# Patient Record
Sex: MALE | Race: AMERICAN INDIAN OR ALASKA NATIVE | Employment: STUDENT | URBAN - METROPOLITAN AREA
[De-identification: names, ages, dates, MRNs, and addresses within clinical notes are randomized per-mention and may not be internally consistent; named-entity substitution may affect disease eponyms.]

---

## 2024-08-21 ENCOUNTER — HOSPITAL ENCOUNTER (INPATIENT)
Facility: HOSPITAL | Age: 24
LOS: 2 days | Discharge: HOME | DRG: 872 | End: 2024-08-24
Attending: EMERGENCY MEDICINE | Admitting: INTERNAL MEDICINE
Payer: COMMERCIAL

## 2024-08-21 ENCOUNTER — APPOINTMENT (OUTPATIENT)
Facility: HOSPITAL | Age: 24
DRG: 872 | End: 2024-08-21
Payer: COMMERCIAL

## 2024-08-21 DIAGNOSIS — R50.9 ACUTE FEBRILE ILLNESS: Primary | ICD-10-CM

## 2024-08-21 DIAGNOSIS — R79.89 ELEVATED TROPONIN: ICD-10-CM

## 2024-08-21 DIAGNOSIS — B17.9 ACUTE HEPATITIS: ICD-10-CM

## 2024-08-21 DIAGNOSIS — R65.20 SEVERE SEPSIS (HCC): ICD-10-CM

## 2024-08-21 DIAGNOSIS — A41.9 SEVERE SEPSIS (HCC): ICD-10-CM

## 2024-08-21 DIAGNOSIS — E87.1 HYPONATREMIA: ICD-10-CM

## 2024-08-21 LAB
ALBUMIN SERPL-MCNC: 3.5 G/DL (ref 3.5–5)
ALBUMIN/GLOB SERPL: 1.1 (ref 1.1–2.2)
ALP SERPL-CCNC: 49 U/L (ref 45–117)
ALT SERPL-CCNC: 681 U/L (ref 12–78)
ANION GAP SERPL CALC-SCNC: 6 MMOL/L (ref 5–15)
AST SERPL-CCNC: 678 U/L (ref 15–37)
BILIRUB SERPL-MCNC: 0.5 MG/DL (ref 0.2–1)
BUN SERPL-MCNC: 14 MG/DL (ref 6–20)
BUN/CREAT SERPL: 10 (ref 12–20)
CALCIUM SERPL-MCNC: 8.6 MG/DL (ref 8.5–10.1)
CHLORIDE SERPL-SCNC: 93 MMOL/L (ref 97–108)
CO2 SERPL-SCNC: 28 MMOL/L (ref 21–32)
CREAT SERPL-MCNC: 1.44 MG/DL (ref 0.7–1.3)
CRP SERPL-MCNC: 4.52 MG/DL (ref 0–0.3)
GLOBULIN SER CALC-MCNC: 3.3 G/DL (ref 2–4)
GLUCOSE BLD STRIP.AUTO-MCNC: 106 MG/DL (ref 65–117)
GLUCOSE SERPL-MCNC: 116 MG/DL (ref 65–100)
LACTATE BLD-SCNC: 1.46 MMOL/L (ref 0.4–2)
POTASSIUM SERPL-SCNC: 3.9 MMOL/L (ref 3.5–5.1)
PROT SERPL-MCNC: 6.8 G/DL (ref 6.4–8.2)
SERVICE CMNT-IMP: NORMAL
SODIUM SERPL-SCNC: 127 MMOL/L (ref 136–145)
TROPONIN I SERPL HS-MCNC: 108 NG/L (ref 0–76)

## 2024-08-21 PROCEDURE — 85025 COMPLETE CBC W/AUTO DIFF WBC: CPT

## 2024-08-21 PROCEDURE — 86140 C-REACTIVE PROTEIN: CPT

## 2024-08-21 PROCEDURE — 96361 HYDRATE IV INFUSION ADD-ON: CPT

## 2024-08-21 PROCEDURE — 99285 EMERGENCY DEPT VISIT HI MDM: CPT

## 2024-08-21 PROCEDURE — 71045 X-RAY EXAM CHEST 1 VIEW: CPT

## 2024-08-21 PROCEDURE — 96374 THER/PROPH/DIAG INJ IV PUSH: CPT

## 2024-08-21 PROCEDURE — 96375 TX/PRO/DX INJ NEW DRUG ADDON: CPT

## 2024-08-21 PROCEDURE — 6370000000 HC RX 637 (ALT 250 FOR IP): Performed by: STUDENT IN AN ORGANIZED HEALTH CARE EDUCATION/TRAINING PROGRAM

## 2024-08-21 PROCEDURE — 80053 COMPREHEN METABOLIC PANEL: CPT

## 2024-08-21 PROCEDURE — 82962 GLUCOSE BLOOD TEST: CPT

## 2024-08-21 PROCEDURE — 87040 BLOOD CULTURE FOR BACTERIA: CPT

## 2024-08-21 PROCEDURE — 6360000002 HC RX W HCPCS: Performed by: STUDENT IN AN ORGANIZED HEALTH CARE EDUCATION/TRAINING PROGRAM

## 2024-08-21 PROCEDURE — 84145 PROCALCITONIN (PCT): CPT

## 2024-08-21 PROCEDURE — 87636 SARSCOV2 & INF A&B AMP PRB: CPT

## 2024-08-21 PROCEDURE — 84484 ASSAY OF TROPONIN QUANT: CPT

## 2024-08-21 PROCEDURE — 2580000003 HC RX 258: Performed by: STUDENT IN AN ORGANIZED HEALTH CARE EDUCATION/TRAINING PROGRAM

## 2024-08-21 PROCEDURE — 86738 MYCOPLASMA ANTIBODY: CPT

## 2024-08-21 PROCEDURE — 80074 ACUTE HEPATITIS PANEL: CPT

## 2024-08-21 PROCEDURE — 83605 ASSAY OF LACTIC ACID: CPT

## 2024-08-21 PROCEDURE — 36415 COLL VENOUS BLD VENIPUNCTURE: CPT

## 2024-08-21 RX ORDER — LIDOCAINE 4 G/G
1 PATCH TOPICAL
Status: DISPENSED | OUTPATIENT
Start: 2024-08-21 | End: 2024-08-22

## 2024-08-21 RX ORDER — ACETAMINOPHEN 500 MG
1000 TABLET ORAL
Status: COMPLETED | OUTPATIENT
Start: 2024-08-21 | End: 2024-08-21

## 2024-08-21 RX ORDER — KETOROLAC TROMETHAMINE 30 MG/ML
30 INJECTION, SOLUTION INTRAMUSCULAR; INTRAVENOUS ONCE
Status: COMPLETED | OUTPATIENT
Start: 2024-08-21 | End: 2024-08-21

## 2024-08-21 RX ORDER — 0.9 % SODIUM CHLORIDE 0.9 %
30 INTRAVENOUS SOLUTION INTRAVENOUS ONCE
Status: COMPLETED | OUTPATIENT
Start: 2024-08-21 | End: 2024-08-22

## 2024-08-21 RX ADMIN — KETOROLAC TROMETHAMINE 30 MG: 30 INJECTION, SOLUTION INTRAMUSCULAR at 23:57

## 2024-08-21 RX ADMIN — ACETAMINOPHEN 1000 MG: 500 TABLET ORAL at 23:26

## 2024-08-21 RX ADMIN — SODIUM CHLORIDE 2244 ML: 9 INJECTION, SOLUTION INTRAVENOUS at 23:27

## 2024-08-21 ASSESSMENT — PAIN DESCRIPTION - DESCRIPTORS: DESCRIPTORS: ACHING

## 2024-08-21 ASSESSMENT — PAIN - FUNCTIONAL ASSESSMENT: PAIN_FUNCTIONAL_ASSESSMENT: ACTIVITIES ARE NOT PREVENTED

## 2024-08-21 ASSESSMENT — PAIN SCALES - GENERAL: PAINLEVEL_OUTOF10: 4

## 2024-08-22 ENCOUNTER — APPOINTMENT (OUTPATIENT)
Facility: HOSPITAL | Age: 24
DRG: 872 | End: 2024-08-22
Attending: INTERNAL MEDICINE
Payer: COMMERCIAL

## 2024-08-22 ENCOUNTER — APPOINTMENT (OUTPATIENT)
Facility: HOSPITAL | Age: 24
DRG: 872 | End: 2024-08-22
Payer: COMMERCIAL

## 2024-08-22 PROBLEM — N17.9 AKI (ACUTE KIDNEY INJURY) (HCC): Status: ACTIVE | Noted: 2024-08-22

## 2024-08-22 PROBLEM — E87.1 HYPONATREMIA: Status: ACTIVE | Noted: 2024-08-22

## 2024-08-22 PROBLEM — R21 RASH: Status: ACTIVE | Noted: 2024-08-22

## 2024-08-22 PROBLEM — R74.8 ELEVATED LIVER ENZYMES: Status: ACTIVE | Noted: 2024-08-22

## 2024-08-22 PROBLEM — D72.819 LEUKOPENIA: Status: ACTIVE | Noted: 2024-08-22

## 2024-08-22 PROBLEM — A41.9 SEPSIS (HCC): Status: ACTIVE | Noted: 2024-08-22

## 2024-08-22 PROBLEM — B17.9 ACUTE HEPATITIS: Status: ACTIVE | Noted: 2024-08-22

## 2024-08-22 PROBLEM — R50.9 ACUTE FEBRILE ILLNESS: Status: ACTIVE | Noted: 2024-08-22

## 2024-08-22 LAB
-: NORMAL
ALBUMIN SERPL-MCNC: 3.1 G/DL (ref 3.5–5)
ALBUMIN/GLOB SERPL: 1 (ref 1.1–2.2)
ALP SERPL-CCNC: 49 U/L (ref 45–117)
ALT SERPL-CCNC: 726 U/L (ref 12–78)
ANION GAP SERPL CALC-SCNC: 6 MMOL/L (ref 5–15)
APPEARANCE UR: CLEAR
AST SERPL-CCNC: 681 U/L (ref 15–37)
B PERT DNA SPEC QL NAA+PROBE: NOT DETECTED
BACTERIA URNS QL MICRO: NEGATIVE /HPF
BASOPHILS # BLD: 0 K/UL (ref 0–0.1)
BASOPHILS NFR BLD: 0 % (ref 0–1)
BILIRUB SERPL-MCNC: 0.4 MG/DL (ref 0.2–1)
BILIRUB UR QL: NEGATIVE
BORDETELLA PARAPERTUSSIS BY PCR: NOT DETECTED
BUN SERPL-MCNC: 8 MG/DL (ref 6–20)
BUN/CREAT SERPL: 7 (ref 12–20)
C PNEUM DNA SPEC QL NAA+PROBE: NOT DETECTED
CALCIUM SERPL-MCNC: 7.7 MG/DL (ref 8.5–10.1)
CHLORIDE SERPL-SCNC: 103 MMOL/L (ref 97–108)
CO2 SERPL-SCNC: 23 MMOL/L (ref 21–32)
COLOR UR: ABNORMAL
COMMENT:: NORMAL
CREAT SERPL-MCNC: 1.08 MG/DL (ref 0.7–1.3)
CREAT UR-MCNC: 105 MG/DL
CRP SERPL-MCNC: 3.7 MG/DL (ref 0–0.3)
DIFFERENTIAL METHOD BLD: ABNORMAL
ECHO AO ARCH DIAM: 1.9 CM
ECHO AO ASC DIAM: 2.3 CM
ECHO AO ASCENDING AORTA INDEX: 1.21 CM/M2
ECHO AO ROOT DIAM: 3.5 CM
ECHO AO ROOT INDEX: 1.84 CM/M2
ECHO AV AREA PEAK VELOCITY: 3.6 CM2
ECHO AV AREA VTI: 3.7 CM2
ECHO AV AREA/BSA PEAK VELOCITY: 1.9 CM2/M2
ECHO AV AREA/BSA VTI: 1.9 CM2/M2
ECHO AV MEAN GRADIENT: 3 MMHG
ECHO AV MEAN VELOCITY: 0.8 M/S
ECHO AV PEAK GRADIENT: 5 MMHG
ECHO AV PEAK VELOCITY: 1.2 M/S
ECHO AV VELOCITY RATIO: 0.83
ECHO AV VTI: 21.7 CM
ECHO BSA: 1.89 M2
ECHO EST RA PRESSURE: 3 MMHG
ECHO LA DIAMETER INDEX: 1.63 CM/M2
ECHO LA DIAMETER: 3.1 CM
ECHO LA TO AORTIC ROOT RATIO: 0.89
ECHO LA VOL A-L A2C: 36 ML (ref 18–58)
ECHO LA VOL A-L A4C: 49 ML (ref 18–58)
ECHO LA VOL BP: 41 ML (ref 18–58)
ECHO LA VOL MOD A2C: 34 ML (ref 18–58)
ECHO LA VOL MOD A4C: 41 ML (ref 18–58)
ECHO LA VOL/BSA BIPLANE: 22 ML/M2 (ref 16–34)
ECHO LA VOLUME AREA LENGTH: 47 ML
ECHO LA VOLUME INDEX A-L A2C: 19 ML/M2 (ref 16–34)
ECHO LA VOLUME INDEX A-L A4C: 26 ML/M2 (ref 16–34)
ECHO LA VOLUME INDEX AREA LENGTH: 25 ML/M2 (ref 16–34)
ECHO LA VOLUME INDEX MOD A2C: 18 ML/M2 (ref 16–34)
ECHO LA VOLUME INDEX MOD A4C: 22 ML/M2 (ref 16–34)
ECHO LV E' LATERAL VELOCITY: 19 CM/S
ECHO LV E' SEPTAL VELOCITY: 13 CM/S
ECHO LV EDV A2C: 96 ML
ECHO LV EDV A4C: 142 ML
ECHO LV EDV BP: 119 ML (ref 67–155)
ECHO LV EDV INDEX A4C: 75 ML/M2
ECHO LV EDV INDEX BP: 63 ML/M2
ECHO LV EDV NDEX A2C: 51 ML/M2
ECHO LV EJECTION FRACTION A2C: 59 %
ECHO LV EJECTION FRACTION A4C: 66 %
ECHO LV EJECTION FRACTION BIPLANE: 63 % (ref 55–100)
ECHO LV ESV A2C: 40 ML
ECHO LV ESV A4C: 48 ML
ECHO LV ESV BP: 44 ML (ref 22–58)
ECHO LV ESV INDEX A2C: 21 ML/M2
ECHO LV ESV INDEX A4C: 25 ML/M2
ECHO LV ESV INDEX BP: 23 ML/M2
ECHO LV FRACTIONAL SHORTENING: 32 % (ref 28–44)
ECHO LV INTERNAL DIMENSION DIASTOLE INDEX: 2.47 CM/M2
ECHO LV INTERNAL DIMENSION DIASTOLIC: 4.7 CM (ref 4.2–5.9)
ECHO LV INTERNAL DIMENSION SYSTOLIC INDEX: 1.68 CM/M2
ECHO LV INTERNAL DIMENSION SYSTOLIC: 3.2 CM
ECHO LV IVSD: 0.9 CM (ref 0.6–1)
ECHO LV MASS 2D: 175.8 G (ref 88–224)
ECHO LV MASS INDEX 2D: 92.5 G/M2 (ref 49–115)
ECHO LV POSTERIOR WALL DIASTOLIC: 1.2 CM (ref 0.6–1)
ECHO LV RELATIVE WALL THICKNESS RATIO: 0.51
ECHO LVOT AREA: 4.5 CM2
ECHO LVOT AV VTI INDEX: 0.84
ECHO LVOT DIAM: 2.4 CM
ECHO LVOT MEAN GRADIENT: 2 MMHG
ECHO LVOT PEAK GRADIENT: 4 MMHG
ECHO LVOT PEAK VELOCITY: 1 M/S
ECHO LVOT STROKE VOLUME INDEX: 43.3 ML/M2
ECHO LVOT SV: 82.3 ML
ECHO LVOT VTI: 18.2 CM
ECHO MV A VELOCITY: 0.42 M/S
ECHO MV AREA VTI: 3.6 CM2
ECHO MV E DECELERATION TIME (DT): 204.5 MS
ECHO MV E VELOCITY: 0.79 M/S
ECHO MV E/A RATIO: 1.88
ECHO MV E/E' LATERAL: 4.16
ECHO MV E/E' RATIO (AVERAGED): 5.12
ECHO MV E/E' SEPTAL: 6.08
ECHO MV LVOT VTI INDEX: 1.27
ECHO MV MAX VELOCITY: 1 M/S
ECHO MV MEAN GRADIENT: 2 MMHG
ECHO MV MEAN VELOCITY: 0.6 M/S
ECHO MV PEAK GRADIENT: 4 MMHG
ECHO MV VTI: 23.1 CM
ECHO PULMONARY ARTERY END DIASTOLIC PRESSURE: 5 MMHG
ECHO PV REGURGITANT MAX VELOCITY: 1.1 M/S
ECHO RIGHT VENTRICULAR SYSTOLIC PRESSURE (RVSP): 24 MMHG
ECHO RV FREE WALL PEAK S': 22 CM/S
ECHO RV INTERNAL DIMENSION: 4.6 CM
ECHO RV TAPSE: 2.5 CM (ref 1.7–?)
ECHO TV REGURGITANT MAX VELOCITY: 2.29 M/S
ECHO TV REGURGITANT PEAK GRADIENT: 21 MMHG
EOSINOPHIL # BLD: 0.1 K/UL (ref 0–0.4)
EOSINOPHIL #/AREA URNS HPF: 0
EOSINOPHIL NFR BLD: 6 % (ref 0–7)
EPITH CASTS URNS QL MICRO: ABNORMAL /LPF
ERYTHROCYTE [DISTWIDTH] IN BLOOD BY AUTOMATED COUNT: 12.4 % (ref 11.5–14.5)
ERYTHROCYTE [DISTWIDTH] IN BLOOD BY AUTOMATED COUNT: 12.6 % (ref 11.5–14.5)
ERYTHROCYTE [SEDIMENTATION RATE] IN BLOOD: 7 MM/HR (ref 0–15)
FLUAV RNA SPEC QL NAA+PROBE: NOT DETECTED
FLUAV SUBTYP SPEC NAA+PROBE: NOT DETECTED
FLUBV RNA SPEC QL NAA+PROBE: NOT DETECTED
FLUBV RNA SPEC QL NAA+PROBE: NOT DETECTED
GLOBULIN SER CALC-MCNC: 3.1 G/DL (ref 2–4)
GLUCOSE SERPL-MCNC: 103 MG/DL (ref 65–100)
GLUCOSE UR STRIP.AUTO-MCNC: NEGATIVE MG/DL
HADV DNA SPEC QL NAA+PROBE: NOT DETECTED
HAV IGM SER QL: NONREACTIVE
HBV CORE IGM SER QL: NONREACTIVE
HBV SURFACE AG SER QL: <0.1 INDEX
HBV SURFACE AG SER QL: NEGATIVE
HCOV 229E RNA SPEC QL NAA+PROBE: NOT DETECTED
HCOV HKU1 RNA SPEC QL NAA+PROBE: NOT DETECTED
HCOV NL63 RNA SPEC QL NAA+PROBE: NOT DETECTED
HCOV OC43 RNA SPEC QL NAA+PROBE: NOT DETECTED
HCT VFR BLD AUTO: 40.6 % (ref 36.6–50.3)
HCT VFR BLD AUTO: 41.8 % (ref 36.6–50.3)
HCV AB SER IA-ACNC: 0.21 INDEX
HCV AB SERPL QL IA: NONREACTIVE
HGB BLD-MCNC: 14.3 G/DL (ref 12.1–17)
HGB BLD-MCNC: 14.3 G/DL (ref 12.1–17)
HGB UR QL STRIP: NEGATIVE
HMPV RNA SPEC QL NAA+PROBE: NOT DETECTED
HPIV1 RNA SPEC QL NAA+PROBE: NOT DETECTED
HPIV2 RNA SPEC QL NAA+PROBE: NOT DETECTED
HPIV3 RNA SPEC QL NAA+PROBE: NOT DETECTED
HPIV4 RNA SPEC QL NAA+PROBE: NOT DETECTED
HYALINE CASTS URNS QL MICRO: ABNORMAL /LPF (ref 0–5)
IMM GRANULOCYTES # BLD AUTO: 0 K/UL
IMM GRANULOCYTES NFR BLD AUTO: 0 %
KETONES UR QL STRIP.AUTO: 15 MG/DL
LEUKOCYTE ESTERASE UR QL STRIP.AUTO: NEGATIVE
LYMPHOCYTES # BLD: 0.5 K/UL (ref 0.8–3.5)
LYMPHOCYTES NFR BLD: 27 % (ref 12–49)
M PNEUMO DNA SPEC QL NAA+PROBE: NOT DETECTED
MCH RBC QN AUTO: 30.4 PG (ref 26–34)
MCH RBC QN AUTO: 30.6 PG (ref 26–34)
MCHC RBC AUTO-ENTMCNC: 34.2 G/DL (ref 30–36.5)
MCHC RBC AUTO-ENTMCNC: 35.2 G/DL (ref 30–36.5)
MCV RBC AUTO: 86.9 FL (ref 80–99)
MCV RBC AUTO: 88.9 FL (ref 80–99)
MONOCYTES # BLD: 0.1 K/UL (ref 0–1)
MONOCYTES NFR BLD: 4 % (ref 5–13)
NEUTS BAND NFR BLD MANUAL: 22 % (ref 0–6)
NEUTS SEG # BLD: 1 K/UL (ref 1.8–8)
NEUTS SEG NFR BLD: 41 % (ref 32–75)
NITRITE UR QL STRIP.AUTO: NEGATIVE
NRBC # BLD: 0 K/UL (ref 0–0.01)
NRBC # BLD: 0 K/UL (ref 0–0.01)
NRBC BLD-RTO: 0 PER 100 WBC
NRBC BLD-RTO: 0 PER 100 WBC
PERIPHERAL SMEAR, MD REVIEW: NORMAL
PH UR STRIP: 6 (ref 5–8)
PLATELET # BLD AUTO: 121 K/UL (ref 150–400)
PLATELET # BLD AUTO: 97 K/UL (ref 150–400)
PMV BLD AUTO: 10 FL (ref 8.9–12.9)
PMV BLD AUTO: 10 FL (ref 8.9–12.9)
POTASSIUM SERPL-SCNC: 4.2 MMOL/L (ref 3.5–5.1)
PROCALCITONIN SERPL-MCNC: 0.66 NG/ML
PROT SERPL-MCNC: 6.2 G/DL (ref 6.4–8.2)
PROT UR STRIP-MCNC: ABNORMAL MG/DL
RBC # BLD AUTO: 4.67 M/UL (ref 4.1–5.7)
RBC # BLD AUTO: 4.7 M/UL (ref 4.1–5.7)
RBC #/AREA URNS HPF: ABNORMAL /HPF (ref 0–5)
RBC MORPH BLD: ABNORMAL
RSV RNA SPEC QL NAA+PROBE: NOT DETECTED
RV+EV RNA SPEC QL NAA+PROBE: NOT DETECTED
SARS-COV-2 RNA RESP QL NAA+PROBE: NOT DETECTED
SARS-COV-2 RNA RESP QL NAA+PROBE: NOT DETECTED
SODIUM SERPL-SCNC: 132 MMOL/L (ref 136–145)
SODIUM UR-SCNC: 10 MMOL/L
SOURCE: NORMAL
SP GR UR REFRACTOMETRY: 1.02 (ref 1–1.03)
SPECIMEN HOLD: NORMAL
TROPONIN I SERPL HS-MCNC: 84 NG/L (ref 0–76)
URINE CULTURE IF INDICATED: ABNORMAL
UROBILINOGEN UR QL STRIP.AUTO: 0.2 EU/DL (ref 0.2–1)
WBC # BLD AUTO: 1.5 K/UL (ref 4.1–11.1)
WBC # BLD AUTO: 1.7 K/UL (ref 4.1–11.1)
WBC MORPH BLD: ABNORMAL
WBC URNS QL MICRO: ABNORMAL /HPF (ref 0–4)

## 2024-08-22 PROCEDURE — 86666 EHRLICHIA ANTIBODY: CPT

## 2024-08-22 PROCEDURE — 70450 CT HEAD/BRAIN W/O DYE: CPT

## 2024-08-22 PROCEDURE — 82570 ASSAY OF URINE CREATININE: CPT

## 2024-08-22 PROCEDURE — 87484 EHRLICHA CHAFFEENSIS AMP PRB: CPT

## 2024-08-22 PROCEDURE — 36415 COLL VENOUS BLD VENIPUNCTURE: CPT

## 2024-08-22 PROCEDURE — 80053 COMPREHEN METABOLIC PANEL: CPT

## 2024-08-22 PROCEDURE — 96376 TX/PRO/DX INJ SAME DRUG ADON: CPT

## 2024-08-22 PROCEDURE — 96366 THER/PROPH/DIAG IV INF ADDON: CPT

## 2024-08-22 PROCEDURE — 96375 TX/PRO/DX INJ NEW DRUG ADDON: CPT

## 2024-08-22 PROCEDURE — 85027 COMPLETE CBC AUTOMATED: CPT

## 2024-08-22 PROCEDURE — 86757 RICKETTSIA ANTIBODY: CPT

## 2024-08-22 PROCEDURE — 76700 US EXAM ABDOM COMPLETE: CPT

## 2024-08-22 PROCEDURE — 86753 PROTOZOA ANTIBODY NOS: CPT

## 2024-08-22 PROCEDURE — 96368 THER/DIAG CONCURRENT INF: CPT

## 2024-08-22 PROCEDURE — G0378 HOSPITAL OBSERVATION PER HR: HCPCS

## 2024-08-22 PROCEDURE — 6370000000 HC RX 637 (ALT 250 FOR IP): Performed by: INTERNAL MEDICINE

## 2024-08-22 PROCEDURE — 96361 HYDRATE IV INFUSION ADD-ON: CPT

## 2024-08-22 PROCEDURE — 87468 ANAPLSMA PHGCYTOPHLM AMP PRB: CPT

## 2024-08-22 PROCEDURE — 0202U NFCT DS 22 TRGT SARS-COV-2: CPT

## 2024-08-22 PROCEDURE — 2580000003 HC RX 258: Performed by: INTERNAL MEDICINE

## 2024-08-22 PROCEDURE — 96372 THER/PROPH/DIAG INJ SC/IM: CPT

## 2024-08-22 PROCEDURE — 6360000002 HC RX W HCPCS: Performed by: STUDENT IN AN ORGANIZED HEALTH CARE EDUCATION/TRAINING PROGRAM

## 2024-08-22 PROCEDURE — 81001 URINALYSIS AUTO W/SCOPE: CPT

## 2024-08-22 PROCEDURE — 96367 TX/PROPH/DG ADDL SEQ IV INF: CPT

## 2024-08-22 PROCEDURE — 2500000003 HC RX 250 WO HCPCS: Performed by: INTERNAL MEDICINE

## 2024-08-22 PROCEDURE — 94761 N-INVAS EAR/PLS OXIMETRY MLT: CPT

## 2024-08-22 PROCEDURE — 93306 TTE W/DOPPLER COMPLETE: CPT

## 2024-08-22 PROCEDURE — 6360000002 HC RX W HCPCS: Performed by: INTERNAL MEDICINE

## 2024-08-22 PROCEDURE — 87205 SMEAR GRAM STAIN: CPT

## 2024-08-22 PROCEDURE — 93306 TTE W/DOPPLER COMPLETE: CPT | Performed by: SPECIALIST

## 2024-08-22 PROCEDURE — 96365 THER/PROPH/DIAG IV INF INIT: CPT

## 2024-08-22 PROCEDURE — 71275 CT ANGIOGRAPHY CHEST: CPT

## 2024-08-22 PROCEDURE — 87040 BLOOD CULTURE FOR BACTERIA: CPT

## 2024-08-22 PROCEDURE — 74177 CT ABD & PELVIS W/CONTRAST: CPT

## 2024-08-22 PROCEDURE — 84300 ASSAY OF URINE SODIUM: CPT

## 2024-08-22 PROCEDURE — 6360000002 HC RX W HCPCS: Performed by: EMERGENCY MEDICINE

## 2024-08-22 PROCEDURE — 2580000003 HC RX 258: Performed by: STUDENT IN AN ORGANIZED HEALTH CARE EDUCATION/TRAINING PROGRAM

## 2024-08-22 PROCEDURE — 86140 C-REACTIVE PROTEIN: CPT

## 2024-08-22 PROCEDURE — 86738 MYCOPLASMA ANTIBODY: CPT

## 2024-08-22 PROCEDURE — 86160 COMPLEMENT ANTIGEN: CPT

## 2024-08-22 PROCEDURE — 99223 1ST HOSP IP/OBS HIGH 75: CPT | Performed by: INTERNAL MEDICINE

## 2024-08-22 PROCEDURE — 6360000004 HC RX CONTRAST MEDICATION: Performed by: RADIOLOGY

## 2024-08-22 PROCEDURE — 86618 LYME DISEASE ANTIBODY: CPT

## 2024-08-22 PROCEDURE — 1100000000 HC RM PRIVATE

## 2024-08-22 PROCEDURE — 84484 ASSAY OF TROPONIN QUANT: CPT

## 2024-08-22 PROCEDURE — 85652 RBC SED RATE AUTOMATED: CPT

## 2024-08-22 PROCEDURE — 2580000003 HC RX 258: Performed by: EMERGENCY MEDICINE

## 2024-08-22 RX ORDER — DIPHENHYDRAMINE HYDROCHLORIDE 50 MG/ML
25 INJECTION INTRAMUSCULAR; INTRAVENOUS EVERY 6 HOURS PRN
Status: DISCONTINUED | OUTPATIENT
Start: 2024-08-22 | End: 2024-08-24 | Stop reason: HOSPADM

## 2024-08-22 RX ORDER — ENOXAPARIN SODIUM 100 MG/ML
40 INJECTION SUBCUTANEOUS DAILY
Status: DISCONTINUED | OUTPATIENT
Start: 2024-08-22 | End: 2024-08-24 | Stop reason: HOSPADM

## 2024-08-22 RX ORDER — IOPAMIDOL 755 MG/ML
100 INJECTION, SOLUTION INTRAVASCULAR
Status: COMPLETED | OUTPATIENT
Start: 2024-08-22 | End: 2024-08-22

## 2024-08-22 RX ORDER — SODIUM CHLORIDE 0.9 % (FLUSH) 0.9 %
5-40 SYRINGE (ML) INJECTION EVERY 12 HOURS SCHEDULED
Status: DISCONTINUED | OUTPATIENT
Start: 2024-08-22 | End: 2024-08-24 | Stop reason: HOSPADM

## 2024-08-22 RX ORDER — ACETAMINOPHEN 325 MG/1
650 TABLET ORAL EVERY 6 HOURS PRN
Status: DISCONTINUED | OUTPATIENT
Start: 2024-08-22 | End: 2024-08-24 | Stop reason: HOSPADM

## 2024-08-22 RX ORDER — ONDANSETRON 2 MG/ML
4 INJECTION INTRAMUSCULAR; INTRAVENOUS EVERY 6 HOURS PRN
Status: DISCONTINUED | OUTPATIENT
Start: 2024-08-22 | End: 2024-08-24 | Stop reason: HOSPADM

## 2024-08-22 RX ORDER — SODIUM CHLORIDE 0.9 % (FLUSH) 0.9 %
5-40 SYRINGE (ML) INJECTION PRN
Status: DISCONTINUED | OUTPATIENT
Start: 2024-08-22 | End: 2024-08-24 | Stop reason: HOSPADM

## 2024-08-22 RX ORDER — ACETAMINOPHEN 650 MG/1
650 SUPPOSITORY RECTAL EVERY 6 HOURS PRN
Status: DISCONTINUED | OUTPATIENT
Start: 2024-08-22 | End: 2024-08-24 | Stop reason: HOSPADM

## 2024-08-22 RX ORDER — 0.9 % SODIUM CHLORIDE 0.9 %
1000 INTRAVENOUS SOLUTION INTRAVENOUS ONCE
Status: COMPLETED | OUTPATIENT
Start: 2024-08-22 | End: 2024-08-22

## 2024-08-22 RX ORDER — SODIUM CHLORIDE 9 MG/ML
INJECTION, SOLUTION INTRAVENOUS PRN
Status: DISCONTINUED | OUTPATIENT
Start: 2024-08-22 | End: 2024-08-24 | Stop reason: HOSPADM

## 2024-08-22 RX ORDER — OXYCODONE HYDROCHLORIDE 5 MG/1
5 TABLET ORAL EVERY 4 HOURS PRN
Status: DISCONTINUED | OUTPATIENT
Start: 2024-08-22 | End: 2024-08-24 | Stop reason: HOSPADM

## 2024-08-22 RX ORDER — POLYETHYLENE GLYCOL 3350 17 G/17G
17 POWDER, FOR SOLUTION ORAL DAILY PRN
Status: DISCONTINUED | OUTPATIENT
Start: 2024-08-22 | End: 2024-08-24 | Stop reason: HOSPADM

## 2024-08-22 RX ORDER — SODIUM CHLORIDE 9 MG/ML
INJECTION, SOLUTION INTRAVENOUS CONTINUOUS
Status: DISCONTINUED | OUTPATIENT
Start: 2024-08-22 | End: 2024-08-24 | Stop reason: HOSPADM

## 2024-08-22 RX ADMIN — IOPAMIDOL 100 ML: 755 INJECTION, SOLUTION INTRAVENOUS at 00:53

## 2024-08-22 RX ADMIN — ACETAMINOPHEN 650 MG: 325 TABLET ORAL at 17:21

## 2024-08-22 RX ADMIN — SODIUM CHLORIDE, PRESERVATIVE FREE 10 ML: 5 INJECTION INTRAVENOUS at 20:21

## 2024-08-22 RX ADMIN — SODIUM CHLORIDE: 9 INJECTION, SOLUTION INTRAVENOUS at 02:06

## 2024-08-22 RX ADMIN — OXYCODONE HYDROCHLORIDE 5 MG: 5 TABLET ORAL at 10:01

## 2024-08-22 RX ADMIN — SODIUM CHLORIDE, PRESERVATIVE FREE 20 MG: 5 INJECTION INTRAVENOUS at 21:50

## 2024-08-22 RX ADMIN — SODIUM CHLORIDE, PRESERVATIVE FREE 10 ML: 5 INJECTION INTRAVENOUS at 09:56

## 2024-08-22 RX ADMIN — SODIUM CHLORIDE, PRESERVATIVE FREE 20 MG: 5 INJECTION INTRAVENOUS at 09:52

## 2024-08-22 RX ADMIN — WATER 2000 MG: 1 INJECTION INTRAMUSCULAR; INTRAVENOUS; SUBCUTANEOUS at 00:14

## 2024-08-22 RX ADMIN — SODIUM CHLORIDE 1000 ML: 9 INJECTION, SOLUTION INTRAVENOUS at 20:21

## 2024-08-22 RX ADMIN — CEFEPIME 2000 MG: 2 INJECTION, POWDER, FOR SOLUTION INTRAVENOUS at 09:53

## 2024-08-22 RX ADMIN — VANCOMYCIN HYDROCHLORIDE 1000 MG: 1 INJECTION, POWDER, LYOPHILIZED, FOR SOLUTION INTRAVENOUS at 11:23

## 2024-08-22 RX ADMIN — DOXYCYCLINE 100 MG: 100 INJECTION, POWDER, LYOPHILIZED, FOR SOLUTION INTRAVENOUS at 14:43

## 2024-08-22 RX ADMIN — DIPHENHYDRAMINE HYDROCHLORIDE 25 MG: 50 INJECTION INTRAMUSCULAR; INTRAVENOUS at 21:51

## 2024-08-22 RX ADMIN — ENOXAPARIN SODIUM 40 MG: 100 INJECTION SUBCUTANEOUS at 09:52

## 2024-08-22 RX ADMIN — DOXYCYCLINE 100 MG: 100 INJECTION, POWDER, LYOPHILIZED, FOR SOLUTION INTRAVENOUS at 04:12

## 2024-08-22 RX ADMIN — VANCOMYCIN HYDROCHLORIDE 1000 MG: 1 INJECTION, POWDER, LYOPHILIZED, FOR SOLUTION INTRAVENOUS at 21:54

## 2024-08-22 RX ADMIN — VANCOMYCIN HYDROCHLORIDE 1750 MG: 10 INJECTION, POWDER, LYOPHILIZED, FOR SOLUTION INTRAVENOUS at 00:28

## 2024-08-22 RX ADMIN — SODIUM CHLORIDE, PRESERVATIVE FREE 20 MG: 5 INJECTION INTRAVENOUS at 02:12

## 2024-08-22 RX ADMIN — WATER 2000 MG: 1 INJECTION INTRAMUSCULAR; INTRAVENOUS; SUBCUTANEOUS at 19:00

## 2024-08-22 ASSESSMENT — PAIN DESCRIPTION - ORIENTATION: ORIENTATION: MID;RIGHT;LEFT

## 2024-08-22 ASSESSMENT — PAIN SCALES - GENERAL
PAINLEVEL_OUTOF10: 0
PAINLEVEL_OUTOF10: 2
PAINLEVEL_OUTOF10: 6

## 2024-08-22 ASSESSMENT — PAIN DESCRIPTION - DESCRIPTORS: DESCRIPTORS: ACHING

## 2024-08-22 ASSESSMENT — PAIN DESCRIPTION - LOCATION: LOCATION: BACK;LEG

## 2024-08-22 NOTE — ED PROVIDER NOTES
[facility] EMERGENCY DEPT  EMERGENCY DEPARTMENT ENCOUNTER      Pt Name: Soto Hurley  MRN: [id number]  Birthdate 2000  Date of evaluation: 8/21/2024  Provider: PINKY Fofana    CHIEF COMPLAINT       Chief Complaint   Patient presents with    Generalized Body Aches    Nausea         HISTORY OF PRESENT ILLNESS    Patient is a 24-year-old male who presents to ED complaining of fever which started 1 week ago.  Patient reports he initially had a small abscess in his right groin last week.  His mother is a physician and performed an I&D and started him on Bactrim.  He started taking this on 8/14.  The following day he developed generalized myalgias, back pain, chills, fever and fatigue.  States he had a slight cough.  He traveled to [facility] with friends and thought he may have a virus. States he was drinking alcohol over the weekend with friends and is unsure if this could be playing a role.  He has been continuing to take Bactrim.  Reports he returned to [place] and for the past 3 days has had decreased appetite, nausea, diarrhea, fever, chills, fatigue and myalgias.  States he has been unable to get out of bed for the past 3 days.  He returned home today and his mother appreciated a bright red rash to his body as well as blistering on his lips.  She is concerned this could be a reaction from Bactrim such as serum sickness.  Denies any history of allergic reactions to any medications but states he has a history of seasonal allergies.  Patient also complains of slight back pain.  Denies any chest pain, shortness of breath, abdominal pain, open wounds or sloughing skin.  Denies similar symptoms previously.  He test himself for COVID a few days ago and it was negative at that time.            Review of External Medical Records:     Nursing Notes were reviewed.    REVIEW OF SYSTEMS       Review of Systems   All other systems reviewed and are negative.      Except as noted above the remainder of the review of  within normal limits   COVID-19 & INFLUENZA COMBO   CULTURE, BLOOD 1   CULTURE, BLOOD 2   PROCALCITONIN   POC LACTIC ACID   URINALYSIS WITH REFLEX TO CULTURE   MYCOPLASMA PNEUMONIAE ANTIBODIES IGG & IGM   HEPATITIS PANEL, ACUTE   POCT GLUCOSE   POCT LACTIC ACID   POCT LACTIC ACID   POCT GLUCOSE       All other labs were within normal range or not returned as of this dictation.    EMERGENCY DEPARTMENT COURSE and DIFFERENTIAL DIAGNOSIS/MDM:   Vitals:    Vitals:    08/21/24 2254 08/21/24 2315   BP: (!) 77/48 (!) 118/55   Pulse: 68 81   Resp: 18 27   Temp: 100 °F (37.8 °C)    TempSrc: Oral    SpO2: 96% 99%   Weight: 72.6 kg (160 lb)    Height: 1.778 m (5' 10\")            Medical Decision Making  Patient is a 24-year-old male with history as described above who presents with fever, chills, myalgias, nausea, decreased appetite as well as diffuse red rash.  On arrival he is hypotensive and febrile.  Differential diagnosis at this point is broad.  Patient's symptoms could be multifactorial in nature.  He does have a diffuse erythematous rash and blistering noted to his lips.  Concern for Jasso-Osman syndrome from Bactrim.  He also could have developed a viral illness in conjunction to this antibiotic reaction.  Sepsis order set initiated.  Will give patient IV fluids, Tylenol and reassess.    Workup significant for leukopenia with WBC count 1.7 with 22% bands.. platelets 121.  CRP is elevated.  Troponin is also elevated to 108.  His liver enzymes are also elevated.  Creatinine 1.44.  Will add on hepatitis panel as well as additional imaging given patient is septic with unknown cause.  Ordered cefepime and vancomycin..  Patient was seen by Dr. Nettles.  Spoke with both patient and mother regarding results.    Total critical care time (not including time spent performing separately reportable procedures): 50 minutes      Amount and/or Complexity of Data Reviewed  Labs: ordered. Decision-making details documented in ED

## 2024-08-22 NOTE — ED NOTES
Verbal report given to Heather ANGEL on Soto Hurley being transferred to [facility] for routine progression of patient care    Report consisted of patient's Situation, Background, Assessment and Recommendations (SBAR)    Information from the following report(s)  Nurse Handoff Report, ED SBAR, MAR, and Recent Results was reviewed with the receiving nurse.    Opportunity for questions and clarification was provided.    Patient transported with:  Registered nurse    Last Filed Values:  Vitals:    08/22/24 0215   BP: 111/62   Pulse: 84   Resp: 21   Temp:    SpO2: 99%          WBC   Date Value Ref Range Status   08/21/2024 1.7 (L) 4.1 - 11.1 K/uL Final        Blood Cultures Drawn:  Yes    Initial Lactic Acid (LA):  Time 2317,  Result 1.46    Repeat LA:  Time Due n/a, Done & Result n/a    Fluid Restriction:  Total needed 2244, Status completed, amount 2244    All Antibiotics Started:  Yes, Dose Due 0014    VS x 2 post-fluid resuscitation:   Yes    Vasopressor Infusion:  No Other n/a    Provider Reassessment needed and notified:  Yes, Due     Additional Interventions/Comments:  n/a

## 2024-08-22 NOTE — PROGRESS NOTES
Spiritual Health Assessment/Progress Note  [facility]    Rituals, Rites and Sacraments,  ,  ,      Name: Soto Hurley MRN: [id number]    Age: 24 y.o.     Sex: male   Language: English   Judaism: Judaism   Sepsis (HCC)     Date: 8/22/2024            Total Time Calculated: 5 min              Spiritual Assessment began in [facility] B3 INTERMEDIATE CARE UNIT        Referral/Consult From: Clergy/   Encounter Overview/Reason: Rituals, Rites and Sacraments  Service Provided For: Patient    Macy, Belief, Meaning:   Patient is connected with a macy tradition or spiritual practice  Family/Friends No family/friends present      Importance and Influence:  Patient has spiritual/personal beliefs that influence decisions regarding their health  Family/Friends no family/friends present    Community:  Patient Other: not known  Family/Friends Other: unknown    Assessment and Plan of Care:     Patient Interventions include: Provided sacramental/Congregation ritual  Family/Friends Interventions include: Other: none    Patient Plan of Care: Spiritual Care available upon further referral  Family/Friends Plan of Care: Spiritual Care available upon further referral    Electronically signed by Chaplain JERARDO on 8/22/2024 at 1:27 PM     Semantics3 visit attempted.  Mr. Hurley is Judaism. Due to medical restrictions, prayer for spiritual communion offered outside his room.     Sr. CONNIE Soto, RN, ACSW, LCSW   Page:  [phone])

## 2024-08-22 NOTE — CARE COORDINATION
Initial Case Management Assessment    Discharge Plan:  Anticipate DC to home with no CM needs identified.    Transportation at DC:  Family is able to assist    Pt was admitted on 8/21/2024 for fevers. CM met with Pt to complete initial assessment. CM introduced self, CM role, and verified demographics.     Pt has no hx of HH, home O2, or equipment. Pt is independent with ADLs and ambulation. Denies problems with either.     [email or web address]     08/22/24 8730   Service Assessment   Patient Orientation Alert and Oriented   History Provided By Patient;Child/Family  (Patient's father is at bedside)   Support Systems Parent;Family Members   Patient's Healthcare Decision Maker is: Legal Next of Kin  (Father at bedside)   PCP Verified by CM Yes  (Jason PARRA, Fabian [phone])   Last Visit to PCP Within last 3 months   Prior Functional Level Independent in ADLs/IADLs   Current Functional Level Independent in ADLs/IADLs   Can patient return to prior living arrangement Yes   Ability to make needs known: Good   Family able to assist with home care needs: Yes   Would you like for me to discuss the discharge plan with any other family members/significant others, and if so, who? No   Financial Resources Other (Comment)  (BCBS)   Community Resources None   CM/SW Referral Other (see comment)  (DC Planning)   Social/Functional History   Lives With Family   Bathroom Toilet Standard   Bathroom Equipment None   Bathroom Accessibility Accessible   Home Equipment None   Receives Help From Family   ADL Assistance Independent   Homemaking Assistance Independent   Ambulation Assistance Independent   Transfer Assistance Independent   Active  Yes   Mode of Transportation Car   Occupation Student: College   Discharge Planning   Type of Residence House   Living Arrangements Family Members   Current Services Prior To Admission None   Potential Assistance Needed N/A   DME Ordered? No   Potential Assistance Purchasing Medications No    Type of Home Care Services None   Patient expects to be discharged to: House   History of falls? 0   Services At/After Discharge   Transition of Care Consult (CM Consult) N/A   Services At/After Discharge None    Resource Information Provided? No   Mode of Transport at Discharge Other (see comment)   Confirm Follow Up Transport Family   Condition of Participation: Discharge Planning   The Plan for Transition of Care is related to the following treatment goals: Sepsis   Freedom of Choice list was provided with basic dialogue that supports the patient's individualized plan of care/goals, treatment preferences, and shares the quality data associated with the providers?  No     ______________________________________  SHIRIN Wade, RN-[facility]- Care Management  Available via [employer]  8/22/2024  12:28 PM

## 2024-08-22 NOTE — PROGRESS NOTES
[facility] Dosing Services: Antimicrobial Stewardship Daily Doc  Consult for antibiotic dosing of Vancomycin/Cefepime by Dr. Evans  Indication: Sepsis  Day of Therapy: 1    Ht Readings from Last 1 Encounters:   08/21/24 1.778 m (5' 10\")        Wt Readings from Last 1 Encounters:   08/21/24 72.6 kg (160 lb)      Vancomycin therapy:  Loading dose: Vancomycin 1750 mg x1 dose @ 0028 on 08/22/24  Maintenance dose: Vancomycin 1000 mg IV every 12 hours   Dose calculated to approximate a           a. Target AUC/YUDELKA of 400-600    Last level: na  Plan:   -Scr=1.44, no previous levels, may need to adjust maintenance dose in the morning based on  AM BMP  -Febrile, WBC=1.7  -will start 1000 mg every 12 hrs, predicted MDY=289, Trough=14.7, will order a random level once 1st maintenance dose is given.   Dose administration notes: Doses given appropriately as scheduled    Non-Kinetic Antimicrobial Dosing Regimen:   Current Regimen:  cefepime  2 g q8h EI  Recommendation: Continue same  Dose administration notes: Doses given appropriately as scheduled    Other Antimicrobial   (not dosed by pharmacist) Doxycycline 100 mg q12h   Cultures 08/21 Blood1  08/22 Blood2  08/22 Urine  08/21 Flu/Covid, negative, final   Serum Creatinine Lab Results   Component Value Date/Time    CREATININE 1.44 08/21/2024 11:02 PM          Creatinine Clearance Estimated Creatinine Clearance: 81 mL/min (A) (based on SCr of 1.44 mg/dL (H)).     Temp Temp: 100 °F (37.8 °C) (Oral)       WBC Lab Results   Component Value Date/Time    WBC 1.7 08/21/2024 11:02 PM          Procalcitonin Lab Results   Component Value Date/Time    PROCAL 0.66 08/21/2024 11:02 PM      For Antifungals, Metronidazole and Nafcillin: Lab Results   Component Value Date/Time     08/21/2024 11:02 PM     08/21/2024 11:02 PM        Pharmacist: Nitza BoatengD, MS, BCPS    [phone]

## 2024-08-22 NOTE — PROGRESS NOTES
Pharmacy Note - Extended Infusion Beta-Lactam Adjustment    Cefepime 1000mg Q8h for treatment of Sepsis of unknown etiology. Per [facility] Extended Infusion Beta-Lactam Policy, cefepime will be changed to 2000mg load followed by 2000mg Q8h extended infusion    Estimated Creatinine Clearance: Estimated Creatinine Clearance: 108 mL/min (based on SCr of 1.08 mg/dL).    BMI: Body mass index is 22.96 kg/m².    Please call with any questions.    Thank you,    NELLY CARVER, [facility]

## 2024-08-22 NOTE — CONSULTS
Infectious Disease Consult    Impression/Plan   Febrile illness with rash, leukopenia, thrombocytopenia and elevated transaminases.  With recent TMP-SMX use I suspect this is a drug reaction to the sulfa antibiotic.  Tickborne illness also in the differential as well as viral illness such as CMV or EBV.  TMP-SMX has been stopped.  Narrow antibiotics to doxycycline.  Serologies for tickborne illness pending.  Also at risk for Babesia as patient was in [facility] however symptoms began prior to travel so concern for babesiosis is low at this point.  Hopefully home tomorrow if symptoms and labs continue to improve  Recurrent right groin abscess.  Check MRSA screen    Anti-infectives:   Vancomycin  Cefepime  Doxycycline    Subjective:   Date of Consultation:  August 22, 2024  Date of Admission: 8/21/2024   Referring Physician:     Patient is a 24 y.o. male with past medical history significant for folliculitis who is being seen for febrile illness.  Patient has a history of recurrent right groin abscess.  His mother is a pediatrician and lanced the abscess approximately a week ago.  He was subsequently started on Bactrim.  The patient states that he began taking the Bactrim the day after the abscess was lanced and during travel to [facility].  The patient stayed in [place] for several days where he developed a fever, soaking sweats, decreased appetite, myalgias, and diarrhea.  He subsequently traveled to [facility] at which time the symptoms persisted.  Upon return to [place] he presented to [facility] where labs revealed leukopenia, thrombocytopenia, and elevated transaminases.  He has been started empirically on vancomycin, cefepime, and doxycycline.  Patient denies any insect bites    Patient Active Problem List   Diagnosis    Sepsis (HCC)    Rash    Elevated liver enzymes    NERY (acute kidney injury) (HCC)    Leukopenia     Past Medical History:   Diagnosis Date    Folliculitis       Family History  Peripheral Smear, MD Review        Pathologic examination results can be viewed in Epic Chart Review.   POC Lactic Acid    Collection Time: 08/21/24 11:17 PM   Result Value Ref Range    POC Lactic Acid 1.46 0.40 - 2.00 mmol/L   POCT Glucose    Collection Time: 08/21/24 11:19 PM   Result Value Ref Range    POC Glucose 106 65 - 117 mg/dL    Performed by: JOLIE PAINTER    Hepatitis Panel, Acute    Collection Time: 08/21/24 11:40 PM   Result Value Ref Range    Hep A IgM NONREACTIVE NR      -        Hepatitis B Surface Ag <0.10 Index    Hep B S Ag Interp Negative NEG      -        Hep B Core Ab, IgM NONREACTIVE NR      -        Hepatitis C Ab 0.21 Index    Hep C Ab Interp NONREACTIVE NR     Culture, Blood 2    Collection Time: 08/22/24 12:36 AM    Specimen: Blood   Result Value Ref Range    Special Requests RIGHT  Antecubital        Culture NO GROWTH AFTER 4 HOURS     Urinalysis with Reflex to Culture    Collection Time: 08/22/24  1:19 AM    Specimen: Urine   Result Value Ref Range    Color, UA YELLOW/STRAW      Appearance CLEAR CLEAR      Specific Gravity, UA 1.024 1.003 - 1.030      pH, Urine 6.0 5.0 - 8.0      Protein, UA TRACE (A) NEG mg/dL    Glucose, Ur Negative NEG mg/dL    Ketones, Urine 15 (A) NEG mg/dL    Bilirubin, Urine Negative NEG      Blood, Urine Negative NEG      Urobilinogen, Urine 0.2 0.2 - 1.0 EU/dL    Nitrite, Urine Negative NEG      Leukocyte Esterase, Urine Negative NEG      WBC, UA 0-4 0 - 4 /hpf    RBC, UA 0-5 0 - 5 /hpf    Epithelial Cells, UA MODERATE (A) FEW /lpf    BACTERIA, URINE Negative NEG /hpf    Urine Culture if Indicated CULTURE NOT INDICATED BY UA RESULT CNI      Hyaline Casts, UA 0-2 0 - 5 /lpf   Creatinine, Random Urine    Collection Time: 08/22/24  1:19 AM   Result Value Ref Range    Creatinine, Ur 105.00 mg/dL   Sodium, urine, random    Collection Time: 08/22/24  2:17 AM   Result Value Ref Range    SODIUM, RANDOM URINE 10 MMOL/L   Troponin    Collection Time: 08/22/24  5:30 AM

## 2024-08-22 NOTE — ED NOTES
TRANSFER - OUT REPORT:    Verbal report given to JEANNE Romero on Soto Hurley  being transferred to [facility] for routine progression of patient care       Report consisted of patient's Situation, Background, Assessment and   Recommendations(SBAR).     Information from the following report(s) Nurse Handoff Report, ED SBAR, MAR, and Recent Results was reviewed with the receiving nurse.    [place] Fall Assessment:    Presents to emergency department  because of falls (Syncope, seizure, or loss of consciousness): No  Age > 70: No  Altered Mental Status, Intoxication with alcohol or substance confusion (Disorientation, impaired judgment, poor safety awaremess, or inability to follow instructions): No  Impaired Mobility: Ambulates or transfers with assistive devices or assistance; Unable to ambulate or transer.: No  Nursing Judgement: No          Lines:   Peripheral IV 08/21/24 Left Antecubital (Active)       Peripheral IV 08/22/24 Right Antecubital (Active)        Opportunity for questions and clarification was provided.      Patient transported with:  Registered Nurse

## 2024-08-22 NOTE — PROGRESS NOTES
Hospitalist Progress Note      NAME:  Soto Hurley   :  2000  MRM:  [id number]    Date/Time: 2024  11:40 AM           Assessment / Plan:     23 yo otherwise healthy, presented w/ fevers, myalgia, sepsis, leukopenia, elevated LFTs      # Sepsis on admission  # Bactrim induced serum sickness vs tick prone illness vs Viral illness [ EBV CMV ]   Given leukopenia, hyponatremia, elevated LFTs on admission was initially concerning for tickborne illness versus Bactrim serum sickness  -Patient's history and lab findings although are concerning for serum sickness 2/2 Bactrim use, patient has reported Bactrim started after an abscess, with last dose reported yesterday  -CT head/chest/abdomen/pelvis were unremarkable  Plan  -Follow-up on labs collected in the ER and on admission  -Discussed with ID will continue patient on Doxy, will DC Vanco/cefepime  -DC Bactrim  -Follow-up on C3/C4  -Can start patient on steroids if needed    #Skin rash, improving  -Most likely from underlying autoimmune reaction    #Elevated LFTs  As above    #Leukopenia   follow-up patient's peripheral smear    # Elevated troponin  Trended down  Follow-up on TTE      #BMI (Calculated): 22.96    I have personally reviewed the radiographs, laboratory data in Epic and decisions and statements above are based partially on this personal interpretation.                 Care Plan discussed with: Patient    Discussed:  Care Plan    Prophylaxis:  Lovenox    Disposition:  Home w/Family           ___________________________________________________    Attending Physician: Mango Gallegos MD        Subjective:     Chief Complaint: Generalized fatigue/fever chills    ROS:  (bold if positive, if negative)    Tolerating PT  Tolerating Diet          Objective:   Patient has been stable since hospital admission with stable vital signs evaluated at bedside    Vitals:          Last 24hrs VS reviewed since prior progress note. Most recent are:    Vitals:     08/22/24 1117   BP: 107/64   Pulse: 82   Resp: 17   Temp: 99.3 °F (37.4 °C)   SpO2: 93%     SpO2 Readings from Last 6 Encounters:   08/22/24 93%          Intake/Output Summary (Last 24 hours) at 8/22/2024 1140  Last data filed at 8/22/2024 0734  Gross per 24 hour   Intake 500 ml   Output 900 ml   Net -400 ml          Exam:     Physical Exam:    Gen:  Well-developed, well-nourished, in no acute distress  HEENT:  Pink conjunctivae, PERRL, hearing intact to voice, moist mucous membranes  Neck:  Supple, without masses, thyroid non-tender  Resp:  No accessory muscle use, clear breath sounds without wheezes rales or rhonchi  Card:  No murmurs, normal S1, S2 without thrills, bruits or peripheral edema  Abd:  Soft, non-tender, non-distended, normoactive bowel sounds are present  Musc:  No cyanosis or clubbing  Skin: Diffuse erythema   neuro:  Cranial nerves 3-12 are grossly intact,  strength is 5/5 bilaterally and dorsi / plantarflexion is 5/5 bilaterally, follows commands appropriately  Psych:  Good insight, oriented to person, place and time, alert       Telemetry reviewed:   normal sinus rhythm    Medications Reviewed: (see below)    Lab Data Reviewed: (see below)    ______________________________________________________________________    Medications:     Current Facility-Administered Medications   Medication Dose Route Frequency    0.9 % sodium chloride infusion   IntraVENous Continuous    sodium chloride flush 0.9 % injection 5-40 mL  5-40 mL IntraVENous 2 times per day    sodium chloride flush 0.9 % injection 5-40 mL  5-40 mL IntraVENous PRN    0.9 % sodium chloride infusion   IntraVENous PRN    enoxaparin (LOVENOX) injection 40 mg  40 mg SubCUTAneous Daily    ondansetron (ZOFRAN) injection 4 mg  4 mg IntraVENous Q6H PRN    polyethylene glycol (GLYCOLAX) packet 17 g  17 g Oral Daily PRN    famotidine (PEPCID) 20 mg in sodium chloride (PF) 0.9 % 10 mL injection  20 mg IntraVENous BID    acetaminophen (TYLENOL)

## 2024-08-22 NOTE — ED TRIAGE NOTES
Pt ambulatory to triage w/ c/o nausea and general bodyaches fr the past three days. Pt reports no appetite for the past 3 days. Mom reports him having an abscess on his leg last week and being treated with bactrim. Reports redness all over for 3 days and fever.

## 2024-08-22 NOTE — H&P
[facility]  [address] [place] [address] [phone]        Hospitalist Admission History and Physical      NAME:  Soto Hurley   :   2000   MRN:  [id number]     PCP:  Fabian Gomez MD     Date/Time of service:  2024  2:03 AM        Subjective:     CHIEF COMPLAINT: myalgia, fevers     HISTORY OF PRESENT ILLNESS:     The patient is a 25 yo otherwise healthy, presented w/ fevers, myalgia, sepsis, leukopenia, elevated LFTs, concerning for tick-borne illness.  The patient stated that he developed a small boil on his R groin last week.  His mother, who is a pediatrician, performed an I&D and put him on oral Bactrim.  The patient subsequently went to [facility] for vacation.  When he returned home, he developed fevers, chills, myalgia, and a diffused erythematous rash.  Denied chest pain, SOB, nausea, vomiting, diarrhea.  In the ED, WBC was 1.7, AST and ALT ~600s.  Chest/abd CT  unremarkable.      Allergies   Allergen Reactions    Bactrim [Sulfamethoxazole-Trimethoprim] Rash       Prior to Admission medications    Not on File       Past Medical History:   Diagnosis Date    Folliculitis         No past surgical history on file.    Social History     Tobacco Use    Smoking status: Never    Smokeless tobacco: Never   Substance Use Topics    Alcohol use: Yes        Family History   Problem Relation Age of Onset    Diabetes Father         Review of Systems:  (bold if positive, if negative)    Gen:   fever, chills, fatigue  Eyes:  ENT:  CVS:  Pulm:  GI:  :  MS:  Skin:  Rash, erythema, aPsych:  Endo:  Hem:  Renal:  Neuro:          Objective:      VITALS:    Vital signs reviewed; most recent are:    Vitals:    24 0148   BP: (!) 106/46   Pulse: 83   Resp: 26   Temp:    SpO2: 93%     SpO2 Readings from Last 6 Encounters:   24 93%        No intake or output data in the 24 hours ending 24 0203     Exam:     Physical Exam:    Gen:  Well-developed,

## 2024-08-23 DIAGNOSIS — D69.6 THROMBOCYTOPENIA (HCC): Primary | ICD-10-CM

## 2024-08-23 PROBLEM — R74.01 TRANSAMINITIS: Status: ACTIVE | Noted: 2024-08-23

## 2024-08-23 LAB
A PHAGOCYTOPH IGG TITR SER IF: NEGATIVE
A PHAGOCYTOPH IGM TITR SER IF: NEGATIVE
ALBUMIN SERPL-MCNC: 2.8 G/DL (ref 3.5–5)
ALBUMIN/GLOB SERPL: 1 (ref 1.1–2.2)
ALP SERPL-CCNC: 47 U/L (ref 45–117)
ALT SERPL-CCNC: 665 U/L (ref 12–78)
ANION GAP SERPL CALC-SCNC: 6 MMOL/L (ref 5–15)
AST SERPL-CCNC: 541 U/L (ref 15–37)
B MICROTI IGG TITR SER: NORMAL {TITER}
B MICROTI IGM TITR SER: NORMAL {TITER}
BACTERIA SPEC CULT: NORMAL
BACTERIA SPEC CULT: NORMAL
BASOPHILS # BLD: 0.1 K/UL (ref 0–0.1)
BASOPHILS NFR BLD: 2 % (ref 0–1)
BILIRUB SERPL-MCNC: 0.4 MG/DL (ref 0.2–1)
BUN SERPL-MCNC: 7 MG/DL (ref 6–20)
BUN/CREAT SERPL: 8 (ref 12–20)
CALCIUM SERPL-MCNC: 8 MG/DL (ref 8.5–10.1)
CHLORIDE SERPL-SCNC: 108 MMOL/L (ref 97–108)
CO2 SERPL-SCNC: 22 MMOL/L (ref 21–32)
COMMENT: NORMAL
COMMENT: NORMAL
COMMENT:: NORMAL
CREAT SERPL-MCNC: 0.93 MG/DL (ref 0.7–1.3)
CRP SERPL-MCNC: 2.1 MG/DL (ref 0–0.3)
DIFFERENTIAL METHOD BLD: ABNORMAL
E CHAFFEENSIS IGG TITR SER IF: NEGATIVE
E CHAFFEENSIS IGM TITR SER IF: NEGATIVE
EOSINOPHIL # BLD: 0.2 K/UL (ref 0–0.4)
EOSINOPHIL NFR BLD: 7 % (ref 0–7)
ERYTHROCYTE [DISTWIDTH] IN BLOOD BY AUTOMATED COUNT: 12.7 % (ref 11.5–14.5)
EST. AVERAGE GLUCOSE BLD GHB EST-MCNC: 103 MG/DL
FOLATE SERPL-MCNC: 18.9 NG/ML (ref 5–21)
GLOBULIN SER CALC-MCNC: 2.8 G/DL (ref 2–4)
GLUCOSE SERPL-MCNC: 109 MG/DL (ref 65–100)
HBA1C MFR BLD: 5.2 % (ref 4–5.6)
HCT VFR BLD AUTO: 38.6 % (ref 36.6–50.3)
HETEROPH AB BLD QL IA: NEGATIVE
HGB BLD-MCNC: 13.5 G/DL (ref 12.1–17)
IMM GRANULOCYTES # BLD AUTO: 0 K/UL
IMM GRANULOCYTES NFR BLD AUTO: 0 %
LACTATE SERPL-SCNC: 0.9 MMOL/L (ref 0.4–2)
LYME ANTIBODY: NEGATIVE
LYMPHOCYTES # BLD: 1.2 K/UL (ref 0.8–3.5)
LYMPHOCYTES NFR BLD: 49 % (ref 12–49)
MAGNESIUM SERPL-MCNC: 2.3 MG/DL (ref 1.6–2.4)
MCH RBC QN AUTO: 31.3 PG (ref 26–34)
MCHC RBC AUTO-ENTMCNC: 35 G/DL (ref 30–36.5)
MCV RBC AUTO: 89.4 FL (ref 80–99)
MONOCYTES # BLD: 0 K/UL (ref 0–1)
MONOCYTES NFR BLD: 1 % (ref 5–13)
NEUTS BAND NFR BLD MANUAL: 2 % (ref 0–6)
NEUTS SEG # BLD: 1 K/UL (ref 1.8–8)
NEUTS SEG NFR BLD: 39 % (ref 32–75)
NRBC # BLD: 0 K/UL (ref 0–0.01)
NRBC BLD-RTO: 0 PER 100 WBC
PHOSPHATE SERPL-MCNC: 2.7 MG/DL (ref 2.6–4.7)
PLATELET # BLD AUTO: 106 K/UL (ref 150–400)
PMV BLD AUTO: 10.1 FL (ref 8.9–12.9)
POTASSIUM SERPL-SCNC: 4.2 MMOL/L (ref 3.5–5.1)
PROCALCITONIN SERPL-MCNC: 0.65 NG/ML
PROT SERPL-MCNC: 5.6 G/DL (ref 6.4–8.2)
RBC # BLD AUTO: 4.32 M/UL (ref 4.1–5.7)
RBC MORPH BLD: ABNORMAL
RICK SF IGG TITR SER IF: NORMAL {TITER}
RICK SF IGM TITR SER IF: NORMAL {TITER}
SERVICE CMNT-IMP: NORMAL
SODIUM SERPL-SCNC: 136 MMOL/L (ref 136–145)
TSH SERPL DL<=0.05 MIU/L-ACNC: 4.79 UIU/ML (ref 0.36–3.74)
VANCOMYCIN SERPL-MCNC: 8.6 UG/ML
VIT B12 SERPL-MCNC: 1016 PG/ML (ref 193–986)
WBC # BLD AUTO: 2.5 K/UL (ref 4.1–11.1)
WBC MORPH BLD: ABNORMAL

## 2024-08-23 PROCEDURE — 85025 COMPLETE CBC W/AUTO DIFF WBC: CPT

## 2024-08-23 PROCEDURE — 82746 ASSAY OF FOLIC ACID SERUM: CPT

## 2024-08-23 PROCEDURE — 96368 THER/DIAG CONCURRENT INF: CPT

## 2024-08-23 PROCEDURE — 6360000002 HC RX W HCPCS: Performed by: STUDENT IN AN ORGANIZED HEALTH CARE EDUCATION/TRAINING PROGRAM

## 2024-08-23 PROCEDURE — 86140 C-REACTIVE PROTEIN: CPT

## 2024-08-23 PROCEDURE — 2500000003 HC RX 250 WO HCPCS: Performed by: INTERNAL MEDICINE

## 2024-08-23 PROCEDURE — 99223 1ST HOSP IP/OBS HIGH 75: CPT | Performed by: INTERNAL MEDICINE

## 2024-08-23 PROCEDURE — 80053 COMPREHEN METABOLIC PANEL: CPT

## 2024-08-23 PROCEDURE — 83036 HEMOGLOBIN GLYCOSYLATED A1C: CPT

## 2024-08-23 PROCEDURE — 2580000003 HC RX 258: Performed by: STUDENT IN AN ORGANIZED HEALTH CARE EDUCATION/TRAINING PROGRAM

## 2024-08-23 PROCEDURE — 2580000003 HC RX 258: Performed by: INTERNAL MEDICINE

## 2024-08-23 PROCEDURE — 84145 PROCALCITONIN (PCT): CPT

## 2024-08-23 PROCEDURE — 84443 ASSAY THYROID STIM HORMONE: CPT

## 2024-08-23 PROCEDURE — 86645 CMV ANTIBODY IGM: CPT

## 2024-08-23 PROCEDURE — 1100000000 HC RM PRIVATE

## 2024-08-23 PROCEDURE — 6360000002 HC RX W HCPCS: Performed by: INTERNAL MEDICINE

## 2024-08-23 PROCEDURE — 86308 HETEROPHILE ANTIBODY SCREEN: CPT

## 2024-08-23 PROCEDURE — 82607 VITAMIN B-12: CPT

## 2024-08-23 PROCEDURE — 96361 HYDRATE IV INFUSION ADD-ON: CPT

## 2024-08-23 PROCEDURE — G0378 HOSPITAL OBSERVATION PER HR: HCPCS

## 2024-08-23 PROCEDURE — 96372 THER/PROPH/DIAG INJ SC/IM: CPT

## 2024-08-23 PROCEDURE — 83735 ASSAY OF MAGNESIUM: CPT

## 2024-08-23 PROCEDURE — 86644 CMV ANTIBODY: CPT

## 2024-08-23 PROCEDURE — 80202 ASSAY OF VANCOMYCIN: CPT

## 2024-08-23 PROCEDURE — 83605 ASSAY OF LACTIC ACID: CPT

## 2024-08-23 PROCEDURE — 84100 ASSAY OF PHOSPHORUS: CPT

## 2024-08-23 PROCEDURE — 36415 COLL VENOUS BLD VENIPUNCTURE: CPT

## 2024-08-23 PROCEDURE — 94761 N-INVAS EAR/PLS OXIMETRY MLT: CPT

## 2024-08-23 PROCEDURE — 96376 TX/PRO/DX INJ SAME DRUG ADON: CPT

## 2024-08-23 PROCEDURE — 96366 THER/PROPH/DIAG IV INF ADDON: CPT

## 2024-08-23 PROCEDURE — 99232 SBSQ HOSP IP/OBS MODERATE 35: CPT | Performed by: INTERNAL MEDICINE

## 2024-08-23 RX ADMIN — DOXYCYCLINE 100 MG: 100 INJECTION, POWDER, LYOPHILIZED, FOR SOLUTION INTRAVENOUS at 15:43

## 2024-08-23 RX ADMIN — CEFEPIME 2000 MG: 2 INJECTION, POWDER, FOR SOLUTION INTRAVENOUS at 08:12

## 2024-08-23 RX ADMIN — SODIUM CHLORIDE, PRESERVATIVE FREE 20 MG: 5 INJECTION INTRAVENOUS at 20:30

## 2024-08-23 RX ADMIN — SODIUM CHLORIDE 1250 MG: 9 INJECTION, SOLUTION INTRAVENOUS at 20:36

## 2024-08-23 RX ADMIN — CEFEPIME 2000 MG: 2 INJECTION, POWDER, FOR SOLUTION INTRAVENOUS at 00:33

## 2024-08-23 RX ADMIN — DOXYCYCLINE 100 MG: 100 INJECTION, POWDER, LYOPHILIZED, FOR SOLUTION INTRAVENOUS at 02:41

## 2024-08-23 RX ADMIN — SODIUM CHLORIDE, PRESERVATIVE FREE 20 MG: 5 INJECTION INTRAVENOUS at 08:07

## 2024-08-23 RX ADMIN — VANCOMYCIN HYDROCHLORIDE 1000 MG: 1 INJECTION, POWDER, LYOPHILIZED, FOR SOLUTION INTRAVENOUS at 11:24

## 2024-08-23 RX ADMIN — SODIUM CHLORIDE: 9 INJECTION, SOLUTION INTRAVENOUS at 11:21

## 2024-08-23 RX ADMIN — CEFEPIME 2000 MG: 2 INJECTION, POWDER, FOR SOLUTION INTRAVENOUS at 17:48

## 2024-08-23 RX ADMIN — SODIUM CHLORIDE, PRESERVATIVE FREE 10 ML: 5 INJECTION INTRAVENOUS at 20:30

## 2024-08-23 RX ADMIN — SODIUM CHLORIDE: 9 INJECTION, SOLUTION INTRAVENOUS at 00:28

## 2024-08-23 RX ADMIN — SODIUM CHLORIDE, PRESERVATIVE FREE 10 ML: 5 INJECTION INTRAVENOUS at 08:07

## 2024-08-23 RX ADMIN — ENOXAPARIN SODIUM 40 MG: 100 INJECTION SUBCUTANEOUS at 08:07

## 2024-08-23 NOTE — PROGRESS NOTES
Hospitalist Progress Note      NAME:  Soto Hurley   :  2000  MRM:  [id number]    Date/Time: 2024  11:41 AM           Assessment / Plan:     25 yo otherwise healthy, presented w/ fevers, myalgia, sepsis, leukopenia, elevated LFTs      # Sepsis on admission, improving  # Febrile illness  # Bactrim induced serum sickness vs tick prone illness vs Viral illness [ EBV CMV ]   Given leukopenia, hyponatremia, elevated LFTs on admission was initially concerning for tickborne illness versus Bactrim serum sickness  -Patient's history and lab findings although are concerning for serum sickness 2/2 Bactrim use, patient has reported Bactrim started after an abscess, with last dose reported 1 day prior to admission  -CT head/chest/abdomen/pelvis were unremarkable  -Patient's labs reassuring, LFTs are trending down, WBCs going up,  Patient has been afebrile overnight  Hepatitis panel negative, Pro-Christ negative  ID consulted and following, and reporting we can DC the broad antibiotics  TTE normal  Right upper quadrant ultrasound normal   Plan  -Continue to follow-up on labs collected in the ER and on admission  -DC Bactrim  -Follow-up on C3/C4  -Can start patient on steroids if needed    # Leukopenia improving  -Hematology consulted    #Skin rash, improving  -Most likely from underlying autoimmune reaction    #Elevated LFTs  As above    # Elevated troponin  Trended down  TTE EF normal.       #BMI (Calculated): 22.96    I have personally reviewed the radiographs, laboratory data in Epic and decisions and statements above are based partially on this personal interpretation.                 Care Plan discussed with: Patient    Discussed:  Care Plan    Prophylaxis:  Lovenox    Disposition:  Home w/Family           ___________________________________________________    Attending Physician: Mango Gallegos MD        Subjective:     Chief Complaint: Generalized fatigue/fever chills    ROS:  (bold if positive, if  negative)    Tolerating PT  Tolerating Diet          Objective:   No events reported overnight for the patient, patient has been stable with stable vital signs no fever spiked overnight    Patient continues to be improving    Vitals:          Last 24hrs VS reviewed since prior progress note. Most recent are:    Vitals:    08/23/24 0805   BP: 107/63   Pulse: 68   Resp: 16   Temp: 97.9 °F (36.6 °C)   SpO2: 94%     SpO2 Readings from Last 6 Encounters:   08/23/24 94%        No intake or output data in the 24 hours ending 08/23/24 1141         Exam:     Physical Exam:    Gen:  Well-developed, well-nourished, in no acute distress  HEENT:  Pink conjunctivae, PERRL, hearing intact to voice, moist mucous membranes  Neck:  Supple, without masses, thyroid non-tender  Resp:  No accessory muscle use, clear breath sounds without wheezes rales or rhonchi  Card:  No murmurs, normal S1, S2 without thrills, bruits or peripheral edema  Abd:  Soft, non-tender, non-distended, normoactive bowel sounds are present  Musc:  No cyanosis or clubbing  Skin: Diffuse erythema has improved from yesterday  neuro:  Cranial nerves 3-12 are grossly intact,  strength is 5/5 bilaterally and dorsi / plantarflexion is 5/5 bilaterally, follows commands appropriately  Psych:  Good insight, oriented to person, place and time, alert       Telemetry reviewed:   normal sinus rhythm    Medications Reviewed: (see below)    Lab Data Reviewed: (see below)    ______________________________________________________________________    Medications:     Current Facility-Administered Medications   Medication Dose Route Frequency    0.9 % sodium chloride infusion   IntraVENous Continuous    sodium chloride flush 0.9 % injection 5-40 mL  5-40 mL IntraVENous 2 times per day    sodium chloride flush 0.9 % injection 5-40 mL  5-40 mL IntraVENous PRN    0.9 % sodium chloride infusion   IntraVENous PRN    enoxaparin (LOVENOX) injection 40 mg  40 mg SubCUTAneous Daily     ondansetron (ZOFRAN) injection 4 mg  4 mg IntraVENous Q6H PRN    polyethylene glycol (GLYCOLAX) packet 17 g  17 g Oral Daily PRN    famotidine (PEPCID) 20 mg in sodium chloride (PF) 0.9 % 10 mL injection  20 mg IntraVENous BID    acetaminophen (TYLENOL) tablet 650 mg  650 mg Oral Q6H PRN    Or    acetaminophen (TYLENOL) suppository 650 mg  650 mg Rectal Q6H PRN    HYDROmorphone (DILAUDID) injection 0.5 mg  0.5 mg IntraVENous Q4H PRN    oxyCODONE (ROXICODONE) immediate release tablet 5 mg  5 mg Oral Q4H PRN    diphenhydrAMINE (BENADRYL) injection 25 mg  25 mg IntraVENous Q6H PRN    doxycycline (VIBRAMYCIN) 100 mg in sodium chloride 0.9 % 100 mL IVPB (mini-bag)  100 mg IntraVENous Q12H    ceFEPIme (MAXIPIME) 2,000 mg in sodium chloride 0.9 % 100 mL IVPB (mini-bag)  2,000 mg IntraVENous Q8H    vancomycin (VANCOCIN) 1,000 mg in sodium chloride 0.9 % 250 mL IVPB (Dlde6Cbc)  1,000 mg IntraVENous Q12H            Lab Review:     Recent Labs     08/21/24  2302 08/22/24  1149 08/23/24  0301   WBC 1.7* 1.5* 2.5*   HGB 14.3 14.3 13.5   HCT 40.6 41.8 38.6   * 97* 106*     Recent Labs     08/21/24  2302 08/22/24  1149 08/23/24  0301   * 132* 136   K 3.9 4.2 4.2   CL 93* 103 108   CO2 28 23 22   BUN 14 8 7   MG  --   --  2.3   PHOS  --   --  2.7   * 726* 665*     No components found for: \"GLPOC\"    Follow-up on C3/C4

## 2024-08-23 NOTE — PROGRESS NOTES
[facility] Dosing Services: Antimicrobial Stewardship Daily Doc  Consult for antibiotic dosing of vancomycin by Dr. Evans  Indication: Sepsis of unknown etiology - possible Tick borne illness, ID consulted  Day of Therapy: 2    Ht Readings from Last 1 Encounters:   08/22/24 1.778 m (5' 10\")        Wt Readings from Last 1 Encounters:   08/22/24 72.6 kg (160 lb)      Vancomycin therapy:    Maintenance dose: Vancomycin 1000 mg IV every 12 hours   Dose calculated to approximate a           a. Target AUC/YUDELKA of 400-600          b. Trough of 15-20 mcg/mL     Last Level: 8.6 mcg/ml (drawn 8/23 @ 1705) ~ 6 hour level post dose -- sub-therapeutic      Assessment/Plan:  CBC, CMP daily  Current regimen confirmed sub-therapeutic with most recent vancomycin level (per insight rx predicts , Tr 7.95.3 T1/2 = 5.21 hours)  Will dose adjust to vancomycin 1250 mg IV every 8 hours (per insight rx predicts , Tr 12.7)  ID recommends discharge abx of doxycycline 100 mg p.o. twice daily x 7 days       Dose administration notes: Doses given appropriately as scheduled    Other Antimicrobial   (not dosed by pharmacist) Cefepime 2 g IV every 8 hours  Doxycycline 100 mg IV every 12 hours   Cultures 8/22 Blood x 2: NGTD - prelim  8/22 MRSA nares: in process  8/21 Blood x 1: NGTD - prelim   Serum Creatinine Lab Results   Component Value Date/Time    CREATININE 0.93 08/23/2024 03:01 AM          Creatinine Clearance Estimated Creatinine Clearance: 126 mL/min (based on SCr of 0.93 mg/dL).     Temp Temp: 98.4 °F (36.9 °C) (Oral)       WBC Lab Results   Component Value Date/Time    WBC 2.5 08/23/2024 03:01 AM          Procalcitonin Lab Results   Component Value Date/Time    PROCAL 0.65 08/23/2024 03:01 AM      For Antifungals, Metronidazole and Nafcillin: Lab Results   Component Value Date/Time     08/23/2024 03:01 AM     08/23/2024 03:01 AM        Pharmacist: Josemanuel Hardy, [facility]  [phone]

## 2024-08-23 NOTE — CONSULTS
[facility]  [address], [address] [facility] [address]  W: [phone]  F: [phone]      Reason for Visit:   Soto Hurley is a 24 y.o. male who is seen today for evaluation of leukopenia     History of Present Illness:   Soto Hurley is a pleasant 24 y.o. male who was admitted on 8/21/24  for N/V ,general body aches and fevers. Taking bactrim for abscess rt groin;following apparent being lanced by mother who is a pediatrician; traveled to [place] then [facility];  IN ED noted hypotensive (77/48)   , temp (100) and diffuse erythematous rash with blistering to lips. ED labs WBC 1.7  ANC 1.0 plts 121  Na 127  Creat 1.44    Tbili  0.5 Troponin 108 ED imaging CTA chest/ abd/ pelvis , ct head and us abd unrevealing   ID consulted and following   Pt states feeling better.  Rash has improved.   Mother and sister at bedside.       Review of systems was obtained and pertinent findings reviewed above. Past medical history, social history, family history, medications, and allergies are located in the electronic medical record.    Physical Exam:   Visit Vitals  /63   Pulse 68   Temp 97.9 °F (36.6 °C) (Oral)   Resp 16   Ht 1.778 m (5' 10\")   Wt 72.6 kg (160 lb)   SpO2 94%   BMI 22.96 kg/m²     ECOG PS: 0  General: no distress  Eyes: anicteric sclerae  HENT: oropharynx clear  Neck: supple  Lymphatic: no cervical, supraclavicular, or inguinal adenopathy  Respiratory: normal respiratory effort  CV: no peripheral edema  GI: soft, nontender, nondistended, no masses  Skin: no ecchymoses; no petechiae; erythema noted to chest area    Results:     Lab Results   Component Value Date    WBC 2.5 (L) 08/23/2024    HGB 13.5 08/23/2024    HCT 38.6 08/23/2024     (L) 08/23/2024    MCV 89.4 08/23/2024    NEUTROABS 1.0 (L) 08/23/2024     Lab Results   Component Value Date     08/23/2024    K 4.2 08/23/2024     08/23/2024    CO2 22 08/23/2024    GLUCOSE 109 (H)  08/23/2024    BUN 7 08/23/2024    CREATININE 0.93 08/23/2024    LABGLOM >90 08/23/2024    CALCIUM 8.0 (L) 08/23/2024    MG 2.3 08/23/2024    PHOS 2.7 08/23/2024     Lab Results   Component Value Date    BILITOT 0.4 08/23/2024     (H) 08/23/2024     (H) 08/23/2024    ALKPHOS 47 08/23/2024    PROTEIN 5.6 (L) 08/23/2024    ALBUMIN 2.8 (L) 08/23/2024    GLOB 2.8 08/23/2024     Lab Results   Component Value Date    HCVABI NONREACTIVE 08/21/2024     Lab Results   Component Value Date    SEDRATE 7 08/22/2024    CRP 2.10 (H) 08/23/2024    TSH 4.79 (H) 08/23/2024      FINAL PATHOLOGIC DIAGNOSIS          Peripheral blood smear, pathologist review:        Moderate leukopenia with left shift   No evidence of blasts   Mild thrombocytopenia   Normocytic normochromic erythrocytes       8/21/24 CTA chest  A/P w cont   Impression   No acute pulmonary embolus. Mild bilateral dependent atelectasis. No acute  process in the abdomen or pelvis.     8/22/24 CT Head wo cont  IMPRESSION:  No acute intracranial process.    8/22/23 US abd   IMPRESSION:  Normal abdominal ultrasound examination.      Test results above have been reviewed.    Assessment/Recommendations:     Neutropenia; improving   Peripheral smear: no blasts   Likely from current reactive/ viral process.   --will repeat lab in several months from acute incident to ensure returned to normalcy.   --follow up established in clinic with labs prior to appt .Reviewed with pt and pt's mother at bedside.     Thrombocytopenia  Likely from current acute process  --will add B12/ folate for completeness  --cont to monitor     Sepsis   --ID consulted ; work up under way for concern for drug reaction to bactrim vs viral illness ( CMV/ EBV) vs tickborne illness vs Babesia; abx narrowed from Vanc/cefepime  to doxycycline     Elevated liver enzymes with normal Tbili  --will defer to primary team when these labs should be repeated to ensure they have returned to normal levels.

## 2024-08-23 NOTE — PROGRESS NOTES
0800: Per Dr. Rosy ZAPATA to give pt lovenox this AM.    1500: Orders noted to ambulate pt. Pt is up ad tiburcio and ambulating in room.

## 2024-08-23 NOTE — PROGRESS NOTES
Hand off Report given to Rashmi ANGEL on 4th floor. Pt Suturation, background, assessment and recommendation (SBAR) given to nurse. Medication order and care plan review  with unit nurse, all concern and question  was answer, pt is being tranfers via wheelchair, accompany by tech and family member.

## 2024-08-23 NOTE — PROGRESS NOTES
[facility] Progress Note  Troy Acuña DO  [phone] Office  [phone] Fax    2024      Assessment & Plan:     Febrile illness with rash, leukopenia, thrombocytopenia and elevated transaminases.  With recent TMP-SMX use I suspect this is a drug reaction to the sulfa antibiotic.  Tickborne illness also in the differential.  The patient feeling much better and labs also improved today.  Recommend discharge on doxycycline 100 mg p.o. twice daily x 7 days.  Okay for discharge from my standpoint.  Okay to stop vancomycin and cefepime.   Recurrent right groin abscess.  This has resolved.  MRSA screen pending.  He may benefit from MRSA decolonization with mupirocin and Hibiclens body wash. Discussed with patient's mother who is a pediatrician.   Sulfa allergy          Subjective:     No complaints.  Feeling much better    Objective:     Vitals: /63   Pulse 68   Temp 97.9 °F (36.6 °C) (Oral)   Resp 16   Ht 1.778 m (5' 10\")   Wt 72.6 kg (160 lb)   SpO2 94%   BMI 22.96 kg/m²      Tmax:  Temp (24hrs), Av.8 °F (37.1 °C), Min:97.9 °F (36.6 °C), Max:100.4 °F (38 °C)      Exam:   Patient is intubated:  no    Physical Examination:   General:  Alert, cooperative, no distress   Head:  Normocephalic, atraumatic.   Eyes:  Conjunctivae clear   Neck: Supple       Lungs:   No distress.     Chest wall:     Heart:     Abdomen:   non-distended   Extremities: Moves all.    Skin: Confluent erythema less intense   Neurologic: CNII-XII intact. Normal strength     Labs:        Invalid input(s): \"ITNL\"   No results for input(s): \"CPK\", \"CKMB\" in the last 72 hours.    Invalid input(s): \"TROIQ\"  Recent Labs     24  2302 24  1149 24  0301   * 132* 136   K 3.9 4.2 4.2   CL 93* 103 108   CO2 28 23 22   BUN 14 8 7   PHOS  --   --  2.7   MG  --   --  2.3   WBC 1.7* 1.5* 2.5*   HGB 14.3 14.3 13.5   HCT 40.6 41.8 38.6   * 97* 106*     No results for input(s): \"INR\",

## 2024-08-24 VITALS
HEIGHT: 70 IN | TEMPERATURE: 97.9 F | DIASTOLIC BLOOD PRESSURE: 69 MMHG | HEART RATE: 56 BPM | BODY MASS INDEX: 22.9 KG/M2 | OXYGEN SATURATION: 96 % | SYSTOLIC BLOOD PRESSURE: 110 MMHG | WEIGHT: 160 LBS | RESPIRATION RATE: 14 BRPM

## 2024-08-24 PROBLEM — D69.6 THROMBOCYTOPENIA (HCC): Status: RESOLVED | Noted: 2024-08-23 | Resolved: 2024-08-24

## 2024-08-24 PROBLEM — N17.9 AKI (ACUTE KIDNEY INJURY) (HCC): Status: RESOLVED | Noted: 2024-08-22 | Resolved: 2024-08-24

## 2024-08-24 PROBLEM — R21 RASH: Status: RESOLVED | Noted: 2024-08-22 | Resolved: 2024-08-24

## 2024-08-24 PROBLEM — R50.9 ACUTE FEBRILE ILLNESS: Status: RESOLVED | Noted: 2024-08-22 | Resolved: 2024-08-24

## 2024-08-24 PROBLEM — B17.9 ACUTE HEPATITIS: Status: RESOLVED | Noted: 2024-08-22 | Resolved: 2024-08-24

## 2024-08-24 PROBLEM — R74.8 ELEVATED LIVER ENZYMES: Status: RESOLVED | Noted: 2024-08-22 | Resolved: 2024-08-24

## 2024-08-24 PROBLEM — A41.9 SEPSIS (HCC): Status: RESOLVED | Noted: 2024-08-22 | Resolved: 2024-08-24

## 2024-08-24 LAB
ALBUMIN SERPL-MCNC: 2.8 G/DL (ref 3.5–5)
ALBUMIN/GLOB SERPL: 1 (ref 1.1–2.2)
ALP SERPL-CCNC: 67 U/L (ref 45–117)
ALT SERPL-CCNC: 875 U/L (ref 12–78)
ANION GAP SERPL CALC-SCNC: 6 MMOL/L (ref 5–15)
AST SERPL-CCNC: 738 U/L (ref 15–37)
BILIRUB SERPL-MCNC: 0.4 MG/DL (ref 0.2–1)
BUN SERPL-MCNC: 7 MG/DL (ref 6–20)
BUN/CREAT SERPL: 9 (ref 12–20)
C3 SERPL-MCNC: 101 MG/DL (ref 82–167)
C4 SERPL-MCNC: 62 MG/DL (ref 12–38)
CALCIUM SERPL-MCNC: 8.1 MG/DL (ref 8.5–10.1)
CHLORIDE SERPL-SCNC: 108 MMOL/L (ref 97–108)
CO2 SERPL-SCNC: 23 MMOL/L (ref 21–32)
CREAT SERPL-MCNC: 0.77 MG/DL (ref 0.7–1.3)
ERYTHROCYTE [DISTWIDTH] IN BLOOD BY AUTOMATED COUNT: 12.7 % (ref 11.5–14.5)
GLOBULIN SER CALC-MCNC: 2.9 G/DL (ref 2–4)
GLUCOSE SERPL-MCNC: 98 MG/DL (ref 65–100)
HCT VFR BLD AUTO: 39.2 % (ref 36.6–50.3)
HGB BLD-MCNC: 13.4 G/DL (ref 12.1–17)
MCH RBC QN AUTO: 31 PG (ref 26–34)
MCHC RBC AUTO-ENTMCNC: 34.2 G/DL (ref 30–36.5)
MCV RBC AUTO: 90.7 FL (ref 80–99)
NRBC # BLD: 0 K/UL (ref 0–0.01)
NRBC BLD-RTO: 0 PER 100 WBC
PLATELET # BLD AUTO: 116 K/UL (ref 150–400)
PMV BLD AUTO: 9.8 FL (ref 8.9–12.9)
POTASSIUM SERPL-SCNC: 4.1 MMOL/L (ref 3.5–5.1)
PROT SERPL-MCNC: 5.7 G/DL (ref 6.4–8.2)
RBC # BLD AUTO: 4.32 M/UL (ref 4.1–5.7)
SODIUM SERPL-SCNC: 137 MMOL/L (ref 136–145)
WBC # BLD AUTO: 3.1 K/UL (ref 4.1–11.1)

## 2024-08-24 PROCEDURE — G0378 HOSPITAL OBSERVATION PER HR: HCPCS

## 2024-08-24 PROCEDURE — 96361 HYDRATE IV INFUSION ADD-ON: CPT

## 2024-08-24 PROCEDURE — 96366 THER/PROPH/DIAG IV INF ADDON: CPT

## 2024-08-24 PROCEDURE — 80053 COMPREHEN METABOLIC PANEL: CPT

## 2024-08-24 PROCEDURE — 2580000003 HC RX 258: Performed by: STUDENT IN AN ORGANIZED HEALTH CARE EDUCATION/TRAINING PROGRAM

## 2024-08-24 PROCEDURE — 85027 COMPLETE CBC AUTOMATED: CPT

## 2024-08-24 PROCEDURE — 2580000003 HC RX 258: Performed by: INTERNAL MEDICINE

## 2024-08-24 PROCEDURE — 6360000002 HC RX W HCPCS: Performed by: STUDENT IN AN ORGANIZED HEALTH CARE EDUCATION/TRAINING PROGRAM

## 2024-08-24 PROCEDURE — 2500000003 HC RX 250 WO HCPCS: Performed by: INTERNAL MEDICINE

## 2024-08-24 PROCEDURE — 96368 THER/DIAG CONCURRENT INF: CPT

## 2024-08-24 PROCEDURE — 36415 COLL VENOUS BLD VENIPUNCTURE: CPT

## 2024-08-24 RX ORDER — DOXYCYCLINE HYCLATE 100 MG
100 TABLET ORAL 2 TIMES DAILY
Qty: 14 TABLET | Refills: 0 | Status: SHIPPED | OUTPATIENT
Start: 2024-08-24 | End: 2024-08-31

## 2024-08-24 RX ADMIN — DOXYCYCLINE 100 MG: 100 INJECTION, POWDER, LYOPHILIZED, FOR SOLUTION INTRAVENOUS at 03:30

## 2024-08-24 RX ADMIN — CEFEPIME 2000 MG: 2 INJECTION, POWDER, FOR SOLUTION INTRAVENOUS at 01:27

## 2024-08-24 RX ADMIN — SODIUM CHLORIDE 1250 MG: 9 INJECTION, SOLUTION INTRAVENOUS at 01:28

## 2024-08-24 NOTE — DISCHARGE SUMMARY
Hospitalist Discharge Summary     Patient ID:  Soto Hurley  [id number]  24 y.o.  2000    Admit date: 8/21/2024    Discharge date and time: 8/24/2024    Admission Diagnoses: Hyponatremia [E87.1]  Acute hepatitis [B17.9]  Elevated troponin [R79.89]  Acute febrile illness [R50.9]  Sepsis (HCC) [A41.9]  Severe sepsis (HCC) [A41.9, R65.20]    Discharge Diagnoses:    Principal Problem (Resolved):    Sepsis (HCC)  Active Problems:    Leukopenia    Hyponatremia    Transaminitis  Resolved Problems:    Rash    Elevated liver enzymes    NERY (acute kidney injury) (HCC)    Acute febrile illness    Acute hepatitis    Thrombocytopenia (HCC)         Hospital Course:   25 yo otherwise healthy, presented w/ fevers, myalgia, sepsis, leukopenia, elevated LFTs        # Sepsis on admission, improving  # Febrile illness  # Bactrim induced serum sickness vs tick prone illness vs Viral illness [ EBV CMV     -Given leukopenia, hyponatremia, elevated LFTs on admission was initially concerning for tickborne illness versus Bactrim serum sickness  -Patient's history and lab findings although are concerning for serum sickness 2/2 Bactrim use, patient has reported Bactrim started after an abscess, with last dose reported 1 day prior to admission  -CT head/chest/abdomen/pelvis were unremarkable  -Patient's labs reassuring, LFTs are trending down, WBCs going up,  -Patient has been afebrile   -Hepatitis panel negative, Pro-Christ negative  -Lyme disease/babesiosis/Ehrlichia negative  ID consulted and following, and reporting we can DC the broad antibiotics and can continue on Doxy p.o. on discharge  TTE normal  Right upper quadrant ultrasound normal   Plan  -Continue Doxy p.o. x 7 days  -Follow-up with PCP to repeat labs     # Leukopenia improving  #Thrombocytopenia  -Hematology consulted and recommended repeating labs outpatient and follow-up with hematology clinic     #Skin rash, improving  -Most likely from underlying autoimmune  reaction    #NERY  Resolved with IVF     #Elevated LFTs  As above     # Elevated troponin  Trended down  TTE EF normal.        Imaging  US ABDOMEN COMPLETE    Result Date: 8/22/2024  Normal abdominal ultrasound examination. Electronically signed by Miranda Escalante    CT ABDOMEN PELVIS W IV CONTRAST Additional Contrast? None    Result Date: 8/22/2024  No acute pulmonary embolus. Mild bilateral dependent atelectasis. No acute process in the abdomen or pelvis. Electronically signed by Troy Parry    CTA CHEST W WO CONTRAST    Result Date: 8/22/2024  No acute pulmonary embolus. Mild bilateral dependent atelectasis. No acute process in the abdomen or pelvis. Electronically signed by Troy Parry    CT HEAD WO CONTRAST    Result Date: 8/22/2024  No acute intracranial process. Electronically signed by Troy Parry    XR CHEST PORTABLE    Result Date: 8/21/2024  No acute process on portable chest. Electronically signed by Troy Parry       PCP: Fabian Gomez MD     Consults: ID/hematology    Condition of patient at discharge: Independent and Improved    Discharge Exam:    Physical Exam:    Gen: Well-developed, well-nourished, in no acute distress  HEENT:  Pink conjunctivae, PERRL, hearing intact to voice, moist mucous membranes  Neck: Supple, without masses, thyroid non-tender  Resp: No accessory muscle use, clear breath sounds without wheezes rales or rhonchi  Card: No murmurs, normal S1, S2 without thrills, bruits or peripheral edema  Abd:  Soft, non-tender, non-distended, normoactive bowel sounds are present, no palpable organomegaly and no detectable hernias  Lymph:  No cervical or inguinal adenopathy  Musc: No cyanosis or clubbing  Skin: Erythema has significantly improved  Neuro:  Cranial nerves are grossly intact, no focal motor weakness, follows commands appropriately  Psych:  Good insight, oriented to person, place and time, alert          Disposition: home    Patient Instructions:   Current Discharge Medication List         START taking these medications    Details   doxycycline hyclate (VIBRA-TABS) 100 MG tablet Take 1 tablet by mouth 2 times daily for 7 days  Qty: 14 tablet, Refills: 0           Activity: activity as tolerated  Diet: regular diet  Wound Care: none needed    Follow-up with Fabian Gomez MD in 1 week.  Follow-up tests/labs CBC/CMP    Approximate time spent in patient care on day of discharge: 50    Signed:  Mango Gallegos MD  8/24/2024  7:27 AM

## 2024-08-24 NOTE — PLAN OF CARE
Problem: Discharge Planning  Goal: Discharge to home or other facility with appropriate resources  8/23/2024 2222 by Yaneli Thakkar, RN  Outcome: /[facility] [place] Progressing  8/23/2024 1026 by Mamie Bello, RN  Outcome: Progressing     Problem: Pain  Goal: Verbalizes/displays adequate comfort level or baseline comfort level  8/23/2024 2222 by Yaneli Thakkar, RN  Outcome: /[facility] [place] Progressing  8/23/2024 1026 by Mamie Bello, RN  Outcome: Progressing

## 2024-08-24 NOTE — DISCHARGE INSTRUCTIONS
HOSPITALIST DISCHARGE INSTRUCTIONS  NAME:  Soto Hurley   :  2000   MRN:  [id number]     Date/Time:  2024 7:25 AM    ADMIT DATE: 2024     DISCHARGE DATE: 2024     DISCHARGE DIAGNOSIS:  Febrile illness     DISCHARGE INSTRUCTIONS:  Thank you for allowing us to participate in your care. Your discharging Hospitalist is Mango Gallegos MD. You were admitted for evaluation and treatment of the above.     Leukopenia, thrombocytopenia:  Most likely viral and already improving.  B12/Folate WNL   -- Outpatient CBC in 3 months followed by Hematology visit     Fever / Sepsis:  Seems viral but with some possibility of drug reaction. Improving.   Continue doxy po x 7 days      Transaminits / NERY:  Both are 2/2 infection and or Bactrim effect, but improving.  FU PCP repeat labs     MEDICATIONS:    It is important that you take the medication exactly as they are prescribed.   Keep your medication in the bottles provided by the pharmacist and keep a list of the medication names, dosages, and times to be taken in your wallet.   Do not take other medications without consulting your doctor.             If you experience any of the following symptoms then please call your primary care physician or return to the emergency room if you cannot get hold of your doctor:  Fever, chills, nausea, vomiting, diarrhea, change in mentation, falling, bleeding, shortness of breath    Follow Up:  Please call the below provider to arrange hospital follow up appointment      Karin Lowry MD  [address] [address]  [facility] [id number] [phone]    Go on 2024  appt at 9 am  Please have labs drawn at [facility] a few days prior to appt    Fabian Gomez MD  [address]  [address]  [facility] [address] [phone]    Call        For questions regarding your Hospitalization or to contact the Hospital Medicine team, please call [phone].      Information obtained by :  I understand that if any  problems occur once I am at home I am to contact my physician.    I understand and acknowledge receipt of the instructions indicated above.                                                                                                                                           Physician's or R.N.'s Signature                                                                  Date/Time                                                                                                                                              Patient or Representative Signature                                                          Date/Time

## 2024-08-25 LAB
BACTERIA SPEC CULT: NORMAL
BACTERIA SPEC CULT: NORMAL
CMV IGG SERPL IA-ACNC: <0.6 U/ML (ref 0–0.59)
CMV IGM SERPL IA-ACNC: <30 AU/ML (ref 0–29.9)
SERVICE CMNT-IMP: NORMAL
SERVICE CMNT-IMP: NORMAL

## 2024-08-27 LAB
A PHAGOCYTOPH DNA BLD QL NAA+PROBE: NEGATIVE
BACTERIA SPEC CULT: NORMAL
E CHAFFEENSIS DNA BLD QL NAA+PROBE: NEGATIVE
M PNEUMO IGG SER IA-ACNC: 772 U/ML (ref 0–99)
M PNEUMO IGM SER IA-ACNC: <770 U/ML (ref 0–769)
SERVICE CMNT-IMP: NORMAL

## 2024-08-28 LAB
BACTERIA SPEC CULT: NORMAL
SERVICE CMNT-IMP: NORMAL

## 2024-08-29 LAB
COMMENT:: NORMAL
RICK SF IGG TITR SER IF: NORMAL {TITER}
RICK SF IGM TITR SER IF: NORMAL {TITER}

## 2024-11-04 DIAGNOSIS — D69.6 THROMBOCYTOPENIA (HCC): ICD-10-CM

## 2025-08-04 ENCOUNTER — TELEPHONE (OUTPATIENT)
Facility: CLINIC | Age: 25
End: 2025-08-04